# Patient Record
Sex: FEMALE | Race: BLACK OR AFRICAN AMERICAN | Employment: UNEMPLOYED | URBAN - METROPOLITAN AREA
[De-identification: names, ages, dates, MRNs, and addresses within clinical notes are randomized per-mention and may not be internally consistent; named-entity substitution may affect disease eponyms.]

---

## 2017-08-30 ENCOUNTER — OFFICE VISIT (OUTPATIENT)
Dept: INFECTIOUS DISEASES | Age: 82
End: 2017-08-30

## 2017-08-30 VITALS — DIASTOLIC BLOOD PRESSURE: 72 MMHG | TEMPERATURE: 98.3 F | SYSTOLIC BLOOD PRESSURE: 148 MMHG | WEIGHT: 174 LBS

## 2017-08-30 DIAGNOSIS — A49.02 MRSA INFECTION: ICD-10-CM

## 2017-08-30 DIAGNOSIS — L08.9 CHRONIC ABDOMINAL WOUND INFECTION, INITIAL ENCOUNTER: Primary | ICD-10-CM

## 2017-08-30 DIAGNOSIS — S31.109A CHRONIC ABDOMINAL WOUND INFECTION, INITIAL ENCOUNTER: Primary | ICD-10-CM

## 2017-08-30 PROCEDURE — G8427 DOCREV CUR MEDS BY ELIG CLIN: HCPCS | Performed by: INTERNAL MEDICINE

## 2017-08-30 PROCEDURE — G8421 BMI NOT CALCULATED: HCPCS | Performed by: INTERNAL MEDICINE

## 2017-08-30 PROCEDURE — 4040F PNEUMOC VAC/ADMIN/RCVD: CPT | Performed by: INTERNAL MEDICINE

## 2017-08-30 PROCEDURE — 1090F PRES/ABSN URINE INCON ASSESS: CPT | Performed by: INTERNAL MEDICINE

## 2017-08-30 PROCEDURE — 1036F TOBACCO NON-USER: CPT | Performed by: INTERNAL MEDICINE

## 2017-08-30 PROCEDURE — 99205 OFFICE O/P NEW HI 60 MIN: CPT | Performed by: INTERNAL MEDICINE

## 2017-08-30 PROCEDURE — 1123F ACP DISCUSS/DSCN MKR DOCD: CPT | Performed by: INTERNAL MEDICINE

## 2017-08-30 RX ORDER — ERGOCALCIFEROL 1.25 MG/1
50000 CAPSULE ORAL WEEKLY
COMMUNITY
End: 2020-02-26

## 2017-08-30 RX ORDER — CALCITRIOL 0.25 UG/1
0.25 CAPSULE, LIQUID FILLED ORAL DAILY
COMMUNITY
End: 2020-02-26

## 2017-08-30 RX ORDER — ISOSORBIDE MONONITRATE 60 MG/1
1 TABLET, EXTENDED RELEASE ORAL DAILY
Refills: 2 | COMMUNITY
Start: 2017-08-07 | End: 2020-02-26 | Stop reason: SDUPTHER

## 2017-08-30 RX ORDER — FERROUS SULFATE 325(65) MG
325 TABLET ORAL
COMMUNITY
End: 2020-02-26

## 2017-08-30 RX ORDER — ATORVASTATIN CALCIUM 40 MG/1
40 TABLET, FILM COATED ORAL DAILY
COMMUNITY
End: 2020-02-26

## 2017-08-30 RX ORDER — AMLODIPINE BESYLATE 5 MG/1
5 TABLET ORAL DAILY
COMMUNITY
End: 2020-02-26 | Stop reason: SDUPTHER

## 2017-09-02 LAB
GRAM STAIN RESULT: ABNORMAL
ORGANISM: ABNORMAL
WOUND/ABSCESS: ABNORMAL

## 2017-09-05 ENCOUNTER — HOSPITAL ENCOUNTER (OUTPATIENT)
Dept: ULTRASOUND IMAGING | Age: 82
Discharge: HOME | End: 2017-09-05
Attending: INTERNAL MEDICINE | Admitting: INTERNAL MEDICINE

## 2017-09-05 DIAGNOSIS — S31.109A OPEN WOUND OF ABDOMINAL WALL WITHOUT PENETRATION INTO PERITONEAL CAVITY: ICD-10-CM

## 2017-09-05 DIAGNOSIS — S31.109A CHRONIC ABDOMINAL WOUND INFECTION, INITIAL ENCOUNTER: ICD-10-CM

## 2017-09-05 DIAGNOSIS — L08.9 CHRONIC ABDOMINAL WOUND INFECTION, INITIAL ENCOUNTER: ICD-10-CM

## 2017-09-05 DIAGNOSIS — A49.02 MRSA INFECTION: ICD-10-CM

## 2017-09-08 ENCOUNTER — TELEPHONE (OUTPATIENT)
Dept: INFECTIOUS DISEASES | Age: 82
End: 2017-09-08

## 2017-09-08 DIAGNOSIS — R18.8 ABDOMINAL WALL FLUID COLLECTIONS: ICD-10-CM

## 2017-09-08 DIAGNOSIS — Z22.322 MRSA (METHICILLIN RESISTANT STAPH AUREUS) CULTURE POSITIVE: Primary | ICD-10-CM

## 2017-09-08 RX ORDER — DOXYCYCLINE HYCLATE 100 MG
100 TABLET ORAL 2 TIMES DAILY
Qty: 20 TABLET | Refills: 0 | Status: SHIPPED | OUTPATIENT
Start: 2017-09-08 | End: 2017-09-18

## 2017-09-20 ENCOUNTER — OFFICE VISIT (OUTPATIENT)
Dept: INFECTIOUS DISEASES | Age: 82
End: 2017-09-20

## 2017-09-20 ENCOUNTER — TELEPHONE (OUTPATIENT)
Dept: INFECTIOUS DISEASES | Age: 82
End: 2017-09-20

## 2017-09-20 VITALS
HEIGHT: 63 IN | DIASTOLIC BLOOD PRESSURE: 76 MMHG | SYSTOLIC BLOOD PRESSURE: 138 MMHG | TEMPERATURE: 98.6 F | WEIGHT: 174 LBS | BODY MASS INDEX: 30.83 KG/M2

## 2017-09-20 DIAGNOSIS — Z22.322 MRSA (METHICILLIN RESISTANT STAPH AUREUS) CULTURE POSITIVE: ICD-10-CM

## 2017-09-20 PROCEDURE — 1123F ACP DISCUSS/DSCN MKR DOCD: CPT | Performed by: INTERNAL MEDICINE

## 2017-09-20 PROCEDURE — 4040F PNEUMOC VAC/ADMIN/RCVD: CPT | Performed by: INTERNAL MEDICINE

## 2017-09-20 PROCEDURE — G8417 CALC BMI ABV UP PARAM F/U: HCPCS | Performed by: INTERNAL MEDICINE

## 2017-09-20 PROCEDURE — G8427 DOCREV CUR MEDS BY ELIG CLIN: HCPCS | Performed by: INTERNAL MEDICINE

## 2017-09-20 PROCEDURE — 1090F PRES/ABSN URINE INCON ASSESS: CPT | Performed by: INTERNAL MEDICINE

## 2017-09-20 PROCEDURE — 1036F TOBACCO NON-USER: CPT | Performed by: INTERNAL MEDICINE

## 2017-09-20 PROCEDURE — 99214 OFFICE O/P EST MOD 30 MIN: CPT | Performed by: INTERNAL MEDICINE

## 2017-09-26 ENCOUNTER — HOSPITAL ENCOUNTER (OUTPATIENT)
Dept: CT IMAGING | Age: 82
Discharge: HOME | End: 2017-09-26
Attending: INTERNAL MEDICINE | Admitting: INTERNAL MEDICINE

## 2017-09-26 DIAGNOSIS — Z22.322 MRSA (METHICILLIN RESISTANT STAPH AUREUS) CULTURE POSITIVE: ICD-10-CM

## 2017-09-26 DIAGNOSIS — K65.1 PERITONEAL ABSCESS (HCC): ICD-10-CM

## 2017-10-11 ENCOUNTER — OFFICE VISIT (OUTPATIENT)
Dept: INFECTIOUS DISEASES | Age: 82
End: 2017-10-11

## 2017-10-11 VITALS
HEIGHT: 63 IN | DIASTOLIC BLOOD PRESSURE: 66 MMHG | TEMPERATURE: 98.6 F | WEIGHT: 174 LBS | BODY MASS INDEX: 30.83 KG/M2 | SYSTOLIC BLOOD PRESSURE: 124 MMHG

## 2017-10-11 DIAGNOSIS — R18.8 ABDOMINAL WALL FLUID COLLECTIONS: Primary | ICD-10-CM

## 2017-10-11 DIAGNOSIS — A49.02 MRSA INFECTION: ICD-10-CM

## 2017-10-11 DIAGNOSIS — Z22.322 MRSA (METHICILLIN RESISTANT STAPH AUREUS) CULTURE POSITIVE: ICD-10-CM

## 2017-10-11 PROCEDURE — 1090F PRES/ABSN URINE INCON ASSESS: CPT | Performed by: INTERNAL MEDICINE

## 2017-10-11 PROCEDURE — 4040F PNEUMOC VAC/ADMIN/RCVD: CPT | Performed by: INTERNAL MEDICINE

## 2017-10-11 PROCEDURE — 1036F TOBACCO NON-USER: CPT | Performed by: INTERNAL MEDICINE

## 2017-10-11 PROCEDURE — G8417 CALC BMI ABV UP PARAM F/U: HCPCS | Performed by: INTERNAL MEDICINE

## 2017-10-11 PROCEDURE — 1123F ACP DISCUSS/DSCN MKR DOCD: CPT | Performed by: INTERNAL MEDICINE

## 2017-10-11 PROCEDURE — G8427 DOCREV CUR MEDS BY ELIG CLIN: HCPCS | Performed by: INTERNAL MEDICINE

## 2017-10-11 PROCEDURE — 99215 OFFICE O/P EST HI 40 MIN: CPT | Performed by: INTERNAL MEDICINE

## 2017-10-11 PROCEDURE — G8484 FLU IMMUNIZE NO ADMIN: HCPCS | Performed by: INTERNAL MEDICINE

## 2017-10-11 RX ORDER — DOXYCYCLINE HYCLATE 100 MG
100 TABLET ORAL 2 TIMES DAILY
Qty: 60 TABLET | Refills: 5 | Status: SHIPPED | OUTPATIENT
Start: 2017-10-11 | End: 2018-03-29 | Stop reason: SDUPTHER

## 2017-10-11 NOTE — PROGRESS NOTES
Infectious Diseases Consult Note    Reason for Consult:   Abdominal wound with culture + MRSA  Requesting Physician:   Jeannette Jaime NP  Primary Care Physician:  Osmar Niño MD  History Obtained From:   Patient, EPIC    CHIEF COMPLAINT:      Chief Complaint   Patient presents with    Follow-up     abdominal wound       HISTORY OF PRESENT ILLNESS:      79 yo woman with HTN, DM, vit D def, CKD, anemia, hyperlipidemia, fatigue. Initial consult 8/30 -   Pt is poor historian,  is helpful  --  Pt had abd wound for 6 - 8 mo. No surgery, no trauma. It is getting worse per pt. No pain. \"drains a little bit\". Culture taken 6/8/17 with skin greta and heavy MRSA (S T/S, doxycycline; R clinda; vanco TRACY 1). Has been on bactrim, keflex, doxycycline, amoxacillin per outpatient notes. pt reports that wound is same. No pain unless someone 'push on it'. Ultrasound 9/5 - fluid collection, 'tract', location 'indeterminate', 'may lie intra-abdominally'    On 9/8, pt HHA reported more drainage and treated with doxycycline 100 bid x 10 days    Today 9/10, pt reports less drainage. No pain. Feeling well - no complaints. Past Medical History:    Past Medical History:   Diagnosis Date    Anemia     CKD (chronic kidney disease)     Diabetes ([facility]     Hyperlipidemia     Hypertension     Vitamin D deficiency        Past Surgical History:    No past surgical history on file.     Current Medications:    Current Outpatient Prescriptions   Medication Sig Dispense Refill    isosorbide mononitrate (IMDUR) 60 MG extended release tablet Take 1 tablet by mouth daily  2    calcitRIOL (ROCALTROL) 0.25 MCG capsule Take 0.25 mcg by mouth daily      amLODIPine (NORVASC) 5 MG tablet Take 5 mg by mouth daily      atorvastatin (LIPITOR) 40 MG tablet Take 40 mg by mouth daily      vitamin D (ERGOCALCIFEROL) 14543 units CAPS capsule Take 50,000 Units by mouth once a week      ferrous sulfate 325 (65 Fe) MG tablet Take 325 mg by mouth 3 times daily (with meals)      amiodarone (CORDARONE) 200 MG tablet TAKE 1 TABLET BY MOUTH DAILY 30 tablet 0     No current facility-administered medications for this visit. Allergies:  No known allergies    Social History:    TOBACCO:    None   ETOH:     None   DRUGS:     None   MARITAL STATUS:           Patient lives in community    Family History:   No immunodeficiency     REVIEW OF SYSTEMS:    No fever / chills / sweats. No weight loss. No visual change, eye pain, eye discharge. No oral lesion, sore throat, dysphagia. Denies cough / sputum. Denies chest pain, palpitations. Denies n / v / abd pain. No diarrhea. Denies dysuria or change in urinary function. Denies joint swelling or pain. No myalgia, arthralgia. Denies skin changes, itching  Denies new / worse depression, psychiatric symptoms  Denies focal weakness, sensory change or other neurologic symptom  No symptoms endocrinopathy. No symptoms hematologic disease. PHYSICAL EXAM:    Vitals:  See intake vitals including weight    GENERAL: No apparent distress. HEENT: Membranes moist, no oral lesion  NECK:  Supple  LYMPH: No adenopathy   LUNGS: Clear b/l, no rales, no dullness  CARDIAC: RRR, no murmur appreciated  ABD:  + BS, soft / NT - R upper abd, small 'pinhole', no drainage expressed; no skin / cutaneous erythema or changes  EXT:  No rash, no edema, no lesions  NEURO: No focal neurologic findings  PSYCH: Orientation, sensorium, mood normal    DATA:    See EPIC    9/26 Abd CT w / wo contrast:  Small caliber catheter fragment within the fat of the lower   anterior rightward mediastinum with associated strandy fluid   collection extending inferiorly and anteriorly to the skin surface   of the upper rightward anterior abdominal wall. 9/5/17 Abd ultrasound:  Fluid collection as described may relate to intra-abdominal   collection extending to the skin surface.  CT examination is   recommended however to determine the origin of the collection and   the extent of the collection     8/30/17 Drainge: culture - light [facility] MRSA   Antibiotic Interpretation TRACY Unit   ceFAZolin Resistant >16 mcg/mL   clindamycin Resistant >2 mcg/mL   erythromycin Resistant >4 mcg/mL   oxacillin Resistant >2 mcg/mL   tetracycline Sensitive <=0.5 mcg/mL   trimethoprim-sulfamethoxazole Sensitive <=0.5/9.5 mcg/mL   vancomycin Sensitive 1 mcg/mL         IMPRESSION:     Abdominal wall drainage from small, 'pinhole', able to probe 3 cm, no assoc abd wall / skin cellulitis. Has collection on ultrasound. CT scan verified an intrathoracic source - has 'catheter'/ radiodense FQ with inflammation and tract to skin in upper abdomen. She was hosp at [facility] 8/2008, had MI, had cath with occluded RCA and had stent placed. She had UTI and MRSA bacteremia per records (!). She now has FB on cardiac silhouette with inflammation and associated tract draining, culture + MRSA (!). I believe what she has is related to procedure in 2008. Would 'suppress' infection. RECOMMENDATIONS:      1. Start doxycycline 100 bid - reviewed SE, how to take. 2. DSD over wound daily - discussed that pt has a chronic problem and will have draining wound  3. Call if worse, if develops systemic symptoms  4. Return in 4 weeks -     - Spent over 40 minutes on visit (including history, physical exam, review of data, development and implementation of treatment plan and coordination of care. - Over 50% of time spent in pt counseling and education.     Called and spoke to Josiah Martin NP - [address] [phone]  Alayna Conklin MD

## 2018-03-29 RX ORDER — DOXYCYCLINE HYCLATE 100 MG
100 TABLET ORAL 2 TIMES DAILY
Qty: 60 TABLET | Refills: 5 | Status: SHIPPED | OUTPATIENT
Start: 2018-03-29 | End: 2018-04-28

## 2019-10-30 RX ORDER — DOXYCYCLINE HYCLATE 100 MG
TABLET ORAL
Qty: 60 TABLET | Refills: 0 | Status: SHIPPED | OUTPATIENT
Start: 2019-10-30 | End: 2019-12-16 | Stop reason: SDUPTHER

## 2020-01-23 ENCOUNTER — TELEPHONE (OUTPATIENT)
Dept: PRIMARY CARE CLINIC | Age: 85
End: 2020-01-23

## 2020-02-26 ENCOUNTER — OFFICE VISIT (OUTPATIENT)
Dept: PRIMARY CARE CLINIC | Age: 85
End: 2020-02-26
Payer: COMMERCIAL

## 2020-02-26 VITALS
BODY MASS INDEX: 39.04 KG/M2 | TEMPERATURE: 97.1 F | SYSTOLIC BLOOD PRESSURE: 138 MMHG | OXYGEN SATURATION: 99 % | WEIGHT: 186 LBS | RESPIRATION RATE: 16 BRPM | HEART RATE: 71 BPM | HEIGHT: 58 IN | DIASTOLIC BLOOD PRESSURE: 70 MMHG

## 2020-02-26 PROCEDURE — 99203 OFFICE O/P NEW LOW 30 MIN: CPT | Performed by: INTERNAL MEDICINE

## 2020-02-26 RX ORDER — ACETAMINOPHEN 160 MG
1 TABLET,DISINTEGRATING ORAL DAILY
COMMUNITY
Start: 2020-02-06 | End: 2020-04-09

## 2020-02-26 RX ORDER — ISOSORBIDE MONONITRATE 60 MG/1
60 TABLET, EXTENDED RELEASE ORAL DAILY
Qty: 30 TABLET | Refills: 2 | Status: SHIPPED | OUTPATIENT
Start: 2020-02-26 | End: 2021-01-06 | Stop reason: SDUPTHER

## 2020-02-26 RX ORDER — OMEPRAZOLE 20 MG/1
20 TABLET, DELAYED RELEASE ORAL DAILY
Qty: 90 TABLET | Refills: 1 | Status: SHIPPED | OUTPATIENT
Start: 2020-02-26 | End: 2020-11-25

## 2020-02-26 RX ORDER — GREEN TEA/HOODIA GORDONII 315-12.5MG
CAPSULE ORAL DAILY
COMMUNITY
End: 2020-02-26

## 2020-02-26 RX ORDER — AMIODARONE HYDROCHLORIDE 200 MG/1
100 TABLET ORAL DAILY
Qty: 45 TABLET | Refills: 3 | Status: SHIPPED | OUTPATIENT
Start: 2020-02-26 | End: 2020-02-26

## 2020-02-26 RX ORDER — GREEN TEA/HOODIA GORDONII 315-12.5MG
1 CAPSULE ORAL DAILY
Qty: 30 TABLET | Refills: 5 | COMMUNITY
Start: 2020-02-26

## 2020-02-26 RX ORDER — CLOPIDOGREL BISULFATE 75 MG/1
75 TABLET ORAL DAILY
Qty: 90 TABLET | Refills: 1 | Status: SHIPPED | OUTPATIENT
Start: 2020-02-26 | End: 2020-08-27

## 2020-02-26 RX ORDER — OMEPRAZOLE 20 MG/1
20 TABLET, DELAYED RELEASE ORAL DAILY
COMMUNITY
End: 2020-02-26 | Stop reason: SDUPTHER

## 2020-02-26 RX ORDER — AMLODIPINE BESYLATE 5 MG/1
5 TABLET ORAL DAILY
Qty: 90 TABLET | Refills: 3 | Status: SHIPPED | OUTPATIENT
Start: 2020-02-26 | End: 2020-10-22

## 2020-02-26 RX ORDER — AMIODARONE HYDROCHLORIDE 200 MG/1
100 TABLET ORAL DAILY
Qty: 90 TABLET | Refills: 3 | Status: SHIPPED | OUTPATIENT
Start: 2020-02-26 | End: 2020-11-25

## 2020-02-26 RX ORDER — CLOPIDOGREL BISULFATE 75 MG/1
TABLET ORAL DAILY
COMMUNITY
Start: 2020-02-03 | End: 2020-02-26 | Stop reason: SDUPTHER

## 2020-02-26 RX ORDER — AMIODARONE HYDROCHLORIDE 200 MG/1
TABLET ORAL
Qty: 90 TABLET | Refills: 3 | Status: SHIPPED | OUTPATIENT
Start: 2020-02-26 | End: 2020-02-26 | Stop reason: SDUPTHER

## 2020-02-26 ASSESSMENT — PATIENT HEALTH QUESTIONNAIRE - PHQ9
SUM OF ALL RESPONSES TO PHQ9 QUESTIONS 1 & 2: 0
DEPRESSION UNABLE TO ASSESS: FUNCTIONAL CAPACITY MOTIVATION LIMITS ACCURACY
SUM OF ALL RESPONSES TO PHQ QUESTIONS 1-9: 0
2. FEELING DOWN, DEPRESSED OR HOPELESS: 0
SUM OF ALL RESPONSES TO PHQ QUESTIONS 1-9: 0
1. LITTLE INTEREST OR PLEASURE IN DOING THINGS: 0

## 2020-02-26 NOTE — PROGRESS NOTES
2020    Gopal Burnett (:  1921) is a 80 y.o. female, here for a preventive medicine evaluation and to establish care. Patient Active Problem List   Diagnosis    Primary osteoarthritis of left knee    Coronary artery disease involving native coronary artery of native heart without angina pectoris    Essential hypertension    CKD (chronic kidney disease) stage 4, GFR 15-29 ml/min ([facility])    Secondary renal hyperparathyroidism ([address]    Type 2 diabetes mellitus with complication, without long-term current use of insulin (HCC)    History of myocardial infarction    Mixed hyperlipidemia    Foreign body of abdominal wall    Anemia    CKD (chronic kidney disease)    Diabetes ([address]    Hyperlipidemia    Hypertension    Vitamin D deficiency    Shortened MD interval       Care Everywhere Result Report  COMPREHENSIVE METABOLIC PANELResulted:  8:43 PM  [facility]  Component Name Value Ref Range   Sodium 141 135 - 146 mmol/L   Potassium 3.2 (L) 3.5 - 5.1 mmol/L   Chloride 104 98 - 110 mmol/L   CO2 24 22 - 29 mmol/L   Anion Gap 13 5 - 13 mmol/L   BUN 17 7 - 25 mg/dL   Creatinine 1.97 (H) 0.5 - 1.2 mg/dL   Glucose 104 (H) 70 - 99 mg/dL   GFR MDRD Af Amer 28   Comment:  GFR is estimated using Creatinine, age, gender and race. Patient's values should be interpreted as a trend.      Below 30 ml/min/1.73m2, renal disease is likely and GFR severely reduced.     For additional information:     [email or web address] org See Note   Calcium 9.3 8.4 - 10.5 mg/dL   GFR MDRD Non Af Amer 23   Comment:  GFR is estimated using Creatinine, age, gender and race. Patient's values should be interpreted as a trend.      Below 30 ml/min/1.73m2, renal disease is likely and GFR severely reduced.     For additional information:     [email or web address] org See Note   Total Bilirubin 0.9 0.2 - 1.2 mg/dL   AST 32 (H) 0 - 30 U/L   ALT 23 0 - 40 U/L   Alkaline Phosphatase 98 33 - 140 U/L   Total Protein 6.0 6 - 8 g/dL   Albumin 3.1 (L) 3.5 - 5 g/dL   Globulin 2.9 2 - 3.7 g/dL   Albumin/Globulin Ratio 1.1      1/21/2019 6/21/2018 12/20/2017 10/26/2017 7/13/2017 4/26/2017 10/13/2016 2/1/2016 5/27/2011 5/25/2011 5/23/2011 5/20/2011 5/18/2011 5/18/2011 5/17/2011 5/16/2011 5/15/2011 5/14/2011 3/25/2011 10/24/2008 10/23/2008 10/22/2008 10/22/2008 10/21/2008 10/20/2008 10/19/2008 10/18/2008 10/17/2008 10/16/2008 10/15/2008 10/14/2008 10/14/2008 10/14/2008 10/13/2008 10/13/2008 10/13/2008     8.8 7.4 7.7 9.0 8.4 9.3 7.9 8.5 6.6 7.0 6.6 6.8 7.4 6.8 7.1 7.3 6.1 8.4   11.6 (H) 12.3 (H)   10.8 16.4 (H) 13.5 (H) 14.2 (H) 15.1 (H) 18.5 (H) 21.1 (H) 26.3 (H) 24.5 (H) 28.6 (H) 27.1 (H) 20.1 (H) 21.0 (H) 20.3 (H)   4.23 4.14 4.18 4.37 4.20 4.23 4.29 4.34 3.29 (L) 3.47 (L) 3.28 (L) 3.23 (L) 3.44 (L) 3.29 (L) 3.26 (L) 3.29 (L) 3.19 (L) 4.04   3.08 (L) 3.23 (L)   2.49 (L) 3.22 (L) 3.04 (L) 3.08 (L) 3.04 (L) 3.24 (L) 3.56 (L) 4.05 3.91 4.44 4.38 3.94 4.84 5.05   12.6 12.6 12.5 13.1 12.4 12.4 12.5 13.0 9.6 (L) 10.0 (L) 9.4 (L) 9.4 (L) 9.9 (L) 9.6 (L) 9.5 (L) 9.6 (L) 9.3 (L) 11.7 12.0 9.3 (L) 9.7 (L) 7.8 (L) 7.5 (L) 9.7 (L) 9.2 (L) 9.3 (L) 9.2 (L) 9.8 (L) 10.4 (L) 11.9 11.5 (L) 13.0 12.8 11.8 14.4 15.2   37.3 37.1 36.6 38.6 36.7 36.8 37.7 39.4 29.2 (L) 30.5 (L) 28.8 (L) 28.4 (L) 30.4 (L) 29.1 (L) 28.6 (L) 29.3 (L) 27.9 (L) 35.4 36.5 27 (L) 28.2 (L) 23 (L) 21.7 (L) 28.1 (L) 26.3 (L) 26.7 (L) 26.5 (L) 28.2 (L) 30.8 (L) 35.3 33.8 (L) 38.3 37.8 35 42.5 44   88.3 89.6 87.6 88.3 87.3 87.1 88.0 90.8 88.6 87.9 87.9 87.9 88.4 88.6 87.8 89.0 87.5 87.6   87.8 87.2 88.1 87.2 87.4 86.6 86.6 87.2 87.1 86.6 87.1 86.6 86.4 86.3 88.8 87.8 87.1   29.8 30.4 30.0 30.0 29.5 29.2 29.2 29.9 29.2 28.9 28.6 29.0 28.9 29.2 29.0 29.1 29.0 29.0   30.3 30.1 29.9 30.2 30.2 30.2 30.1 30.4 30.3 29.3 29.3 29.3 29.4 29.2 30.1 29.7 30.1   33.7 33.9 34.2 34.0 33.8 33.6 33.2 32.9 33.0 32.9 32.6 33.0 32.7 33.0 33.0 32.7 33.2 33.1   34.5 34.5 34.0 34.6 34.6 34.9 34.8 34.8 34.8 33.9 33.7 33.9 34.0 sounds: Normal breath sounds. No stridor. No wheezing or rhonchi. Chest:      Chest wall: No tenderness. Abdominal:      General: Bowel sounds are normal. There is no distension. Palpations: There is no mass. Tenderness: There is no abdominal tenderness. There is no right CVA tenderness, left CVA tenderness, guarding or rebound. Hernia: No hernia is present. Musculoskeletal:         General: No swelling, tenderness, deformity or signs of injury. Right lower leg: No edema. Left lower leg: No edema. Comments: Deformity of left knee with swelling but no warmth and limited range of motion. Lymphadenopathy:      Cervical: No cervical adenopathy. Skin:     General: Skin is warm and dry. Neurological:      General: No focal deficit present. Mental Status: She is alert and oriented to person, place, and time. Cranial Nerves: No cranial nerve deficit. Sensory: No sensory deficit. Motor: No weakness. Coordination: Coordination normal.      Gait: Gait normal.   Psychiatric:         Mood and Affect: Mood normal.         Behavior: Behavior normal.         Thought Content: Thought content normal.         Judgment: Judgment normal.         No flowsheet data found. No results found for: CHOL, CHOLFAST, TRIG, TRIGLYCFAST, HDL, LDLCHOLESTEROL, LDLCALC, GLUF, GLUCOSE, LABA1C    The ASCVD Risk score (Severo Elvira., et al., 2013) failed to calculate for the following reasons: The 2013 ASCVD risk score is only valid for ages 36 to 78      There is no immunization history on file for this patient.     Health Maintenance   Topic Date Due    TSH testing  01/05/1921    Lipid screen  01/05/1931    DTaP/Tdap/Td vaccine (1 - Tdap) 01/05/1932    Shingles Vaccine (1 of 2) 01/05/1971    Pneumococcal 65+ years Vaccine (1 of 1 - PPSV23) 01/05/1986    Flu vaccine (1) 09/01/2019    Hepatitis A vaccine  Aged Out    Hepatitis B vaccine  Aged Out    Hib vaccine  Aged C/ [address] Meningococcal (ACWY) vaccine  Aged Out        Diagnosis Orders   1. Decreased hearing of both Conception DO Maddy, Otolaryngology, [facility]   2. Paroxysmal atrial fibrillation (HCC)  AMIODARONE LEVEL    DISCONTINUED: amiodarone (CORDARONE) 200 MG tablet   3. Coronary artery disease involving native coronary artery of native heart without angina pectoris  clopidogrel (PLAVIX) 75 MG tablet    isosorbide mononitrate (IMDUR) 60 MG extended release tablet   4. Gastroesophageal reflux disease without esophagitis  omeprazole (PRILOSEC OTC) 20 MG tablet   5. Essential hypertension  amLODIPine (NORVASC) 5 MG tablet    TSH WITH REFLEX TO FT4    Renal Function Panel   6. Type 2 diabetes mellitus with complication, without long-term current use of insulin ([facility]) will monitor A1c. Not symptomatic  Hemoglobin A1C    Vitamin B12   7. Vitamin D deficiency on supplement with goal 40-15 monitor level. Vitamin D 25 Hydroxy   8. Primary osteoarthritis of left knee surgical candidate and currently not having problems. Will monitor since she will be at candidate for Cartledge injection or external braces. Ambulating will now independently. 9. CKD (chronic kidney disease) stage 4, GFR 15-29 ml/min ([facility]) stable under care of nephrology avoid NSAIDs and nephrotoxic medications. 10. Secondary renal hyperparathyroidism ([facility] [address] managed by nephrology. 11.  foreign body catheter tip and anterior mediastinum wall tracking to the lower abdomen abdominal wall first noted in 2017 very ill at that time and hospitalized. Subsequently she is been on doxycycline hundred milligrams twice a day and told children to take this for the rest of her life. Patient has not had any adverse effects with doxycycline. Of doxycycline. 12. Anemia due to chronic kidne with y disease, unspecified CKD stage     15. Stage 4 chronic kidney disease ([address]     16.  Diabetes mellitus due to underlying condition with stage 4 chronic kidney disease, unspecified whether long term insulin use ([facility] [address]     17. Shortened [place] interval     18. Intramural leiomyoma of uterus     19. Decreased hearing, bilateral refer to ENT to see candidate for hearing aids. An electronic signature was used to authenticate this note.     --Tania Lynch MD on 2/26/2020 at 5:40 PM

## 2020-02-28 PROBLEM — D25.1 INTRAMURAL LEIOMYOMA OF UTERUS: Status: ACTIVE | Noted: 2020-02-28

## 2020-02-28 PROBLEM — S30.851A FOREIGN BODY OF ABDOMINAL WALL: Status: ACTIVE | Noted: 2020-02-28

## 2020-02-28 PROBLEM — N25.81 SECONDARY RENAL HYPERPARATHYROIDISM (HCC): Status: ACTIVE | Noted: 2020-02-28

## 2020-02-28 PROBLEM — E11.8 TYPE 2 DIABETES MELLITUS WITH COMPLICATION, WITHOUT LONG-TERM CURRENT USE OF INSULIN (HCC): Status: ACTIVE | Noted: 2020-02-28

## 2020-02-28 PROBLEM — I25.2 HISTORY OF MYOCARDIAL INFARCTION: Status: ACTIVE | Noted: 2020-02-28

## 2020-02-28 PROBLEM — E78.2 MIXED HYPERLIPIDEMIA: Status: ACTIVE | Noted: 2020-02-28

## 2020-02-28 PROBLEM — N18.4 CKD (CHRONIC KIDNEY DISEASE) STAGE 4, GFR 15-29 ML/MIN (HCC): Status: ACTIVE | Noted: 2020-02-28

## 2020-02-28 PROBLEM — M17.12 PRIMARY OSTEOARTHRITIS OF LEFT KNEE: Status: ACTIVE | Noted: 2020-02-28

## 2020-02-28 PROBLEM — H91.93 DECREASED HEARING, BILATERAL: Status: ACTIVE | Noted: 2020-02-28

## 2020-02-28 PROBLEM — I25.10 CORONARY ARTERY DISEASE INVOLVING NATIVE CORONARY ARTERY OF NATIVE HEART WITHOUT ANGINA PECTORIS: Status: ACTIVE | Noted: 2020-02-28

## 2020-02-28 PROBLEM — I10 ESSENTIAL HYPERTENSION: Status: ACTIVE | Noted: 2020-02-28

## 2020-02-28 PROBLEM — R94.31 SHORTENED PR INTERVAL: Status: ACTIVE | Noted: 2020-02-28

## 2020-02-28 SDOH — HEALTH STABILITY: PHYSICAL HEALTH: ON AVERAGE, HOW MANY DAYS PER WEEK DO YOU ENGAGE IN MODERATE TO STRENUOUS EXERCISE (LIKE A BRISK WALK)?: 0 DAYS

## 2020-02-28 SDOH — HEALTH STABILITY: MENTAL HEALTH
STRESS IS WHEN SOMEONE FEELS TENSE, NERVOUS, ANXIOUS, OR CAN'T SLEEP AT NIGHT BECAUSE THEIR MIND IS TROUBLED. HOW STRESSED ARE YOU?: NOT AT ALL

## 2020-02-28 ASSESSMENT — ENCOUNTER SYMPTOMS
RESPIRATORY NEGATIVE: 1
ALLERGIC/IMMUNOLOGIC NEGATIVE: 1

## 2020-03-05 NOTE — PROGRESS NOTES
Deann Chopra   1/5/1921, 80 y.o. female   [id number]       Referring Provider: Rhianna Claros DO  Referral Type: In an order in [address] [place] [address]    Reason for Visit: Evaluation of suspected change in hearing, tinnitus, or balance. ADULT AUDIOLOGIC EVALUATION      Deann Chopra is a 80 y.o. female seen today, 3/11/2020 for an initial audiologic evaluation. Patient was seen accompanied by her son. AUDIOLOGIC AND OTHER PERTINENT MEDICAL HISTORY:        Deann Chopra noted decreased hearing bilaterally, she believes she hears okay but her family has concerns, no concerns for a difference in hearing between her ears, both are about the same. Deann Chopra denied otalgia, aural fullness, otorrhea, tinnitus, dizziness, history of occupational/recreational noise exposure, history of head trauma, history of ear surgery, and family history of hearing loss. IMPRESSIONS:       Today's results are consistent with bilateral sensorineural hearing loss. Hearing loss is significant enough to result in difficulty understanding speech in most listening environments. Recommended binaural amplification. ASSESSMENT AND FINDINGS:       Otoscopy revealed: Minimal cerumen observed in ear canals bilaterally      RIGHT EAR:  Hearing Sensitivity: Moderate through 3000 Hz sloping to severe sensorineural hearing loss. Speech Recognition Threshold: 45 dB HL (MLV)  Word Recognition: Good (80%), based on NU-6 25-word list at 75 dBHL using recorded speech stimuli. This finding is consistent with hearing sensitivity. Tympanometry: Borderline-negative peak pressure with low compliance, Type As tympanogram, consistent with reduced tympanic membrane mobility. LEFT EAR:  Hearing Sensitivity: Moderate through 2000 Hz sloping to severe sensorineural hearing loss. Speech Recognition Threshold: 50 dB HL (MLV)  Word Recognition: Good (80%), based on NU-6 25-word list at 75 dBHL using recorded speech stimuli.   This finding is consistent with

## 2020-03-11 ENCOUNTER — PROCEDURE VISIT (OUTPATIENT)
Dept: AUDIOLOGY | Age: 85
End: 2020-03-11
Payer: COMMERCIAL

## 2020-03-11 ENCOUNTER — OFFICE VISIT (OUTPATIENT)
Dept: ENT CLINIC | Age: 85
End: 2020-03-11
Payer: COMMERCIAL

## 2020-03-11 VITALS
BODY MASS INDEX: 31.91 KG/M2 | DIASTOLIC BLOOD PRESSURE: 85 MMHG | HEART RATE: 71 BPM | TEMPERATURE: 97.1 F | HEIGHT: 58 IN | SYSTOLIC BLOOD PRESSURE: 169 MMHG | WEIGHT: 152 LBS

## 2020-03-11 PROBLEM — H90.3 SENSORINEURAL HEARING LOSS, BILATERAL: Status: ACTIVE | Noted: 2020-03-11

## 2020-03-11 PROCEDURE — 92567 TYMPANOMETRY: CPT | Performed by: AUDIOLOGIST

## 2020-03-11 PROCEDURE — 92557 COMPREHENSIVE HEARING TEST: CPT | Performed by: AUDIOLOGIST

## 2020-03-11 PROCEDURE — 99203 OFFICE O/P NEW LOW 30 MIN: CPT | Performed by: OTOLARYNGOLOGY

## 2020-03-11 ASSESSMENT — ENCOUNTER SYMPTOMS
FACIAL SWELLING: 0
EYE ITCHING: 0
SINUS PRESSURE: 0
WHEEZING: 0
SHORTNESS OF BREATH: 0
COUGH: 0
BLOOD IN STOOL: 0
PHOTOPHOBIA: 0
SINUS PAIN: 0
CHOKING: 0
BACK PAIN: 0
EYE DISCHARGE: 0
VOMITING: 0
RHINORRHEA: 0
DIARRHEA: 0
NAUSEA: 0
CONSTIPATION: 0
COLOR CHANGE: 0
VOICE CHANGE: 0
SORE THROAT: 0
TROUBLE SWALLOWING: 0
STRIDOR: 0

## 2020-03-11 NOTE — PROGRESS NOTES
file   Tobacco Use    Smoking status: Never Smoker    Smokeless tobacco: Never Used   Substance and Sexual Activity    Alcohol use: Not Currently    Drug use: Never    Sexual activity: Not Currently     Partners: Male   Lifestyle    Physical activity     Days per week: 0 days     Minutes per session: Not on file    Stress: Not at all   Relationships    Social connections     Talks on phone: Not on file     Gets together: Not on file     Attends Religion service: Not on file     Active member of club or organization: Not on file     Attends meetings of clubs or organizations: Not on file     Relationship status: Not on file    Intimate partner violence     Fear of current or ex partner: Not on file     Emotionally abused: Not on file     Physically abused: Not on file     Forced sexual activity: Not on file   Other Topics Concern    Not on file   Social History Narrative    Not on file       Allergies     Allergies   Allergen Reactions    No Known Allergies        Medications     Current Outpatient Medications   Medication Sig Dispense Refill    Cholecalciferol (VITAMIN D3) 50 MCG (2000 UT) CAPS Take 1 capsule by mouth daily      clopidogrel (PLAVIX) 75 MG tablet Take 1 tablet by mouth daily 90 tablet 1    omeprazole (PRILOSEC OTC) 20 MG tablet Take 1 tablet by mouth daily 90 tablet 1    isosorbide mononitrate (IMDUR) 60 MG extended release tablet Take 1 tablet by mouth daily 30 tablet 2    amLODIPine (NORVASC) 5 MG tablet Take 1 tablet by mouth daily 90 tablet 3    Lactobacillus (PROBIOTIC ACIDOPHILUS) TABS Take 1 tablet by mouth daily 30 tablet 5    amiodarone (CORDARONE) 200 MG tablet Take 0.5 tablets by mouth daily disreguard the previous script only taking 100 mg daily. 90 tablet 3    doxycycline hyclate (VIBRA-TABS) 100 MG tablet TAKE 1 TABLET BY MOUTH TWICE DAILY 60 tablet 11     No current facility-administered medications for this visit.         Review of Systems     Review of Systems Constitutional: Negative for activity change, appetite change, chills, diaphoresis, fatigue, fever and unexpected weight change. HENT: Positive for hearing loss. Negative for congestion, dental problem, drooling, ear discharge, ear pain, facial swelling, mouth sores, nosebleeds, postnasal drip, rhinorrhea, sinus pressure, sinus pain, sneezing, sore throat, tinnitus, trouble swallowing and voice change. Eyes: Negative for photophobia, discharge, itching and visual disturbance. Respiratory: Negative for cough, choking, shortness of breath, wheezing and stridor. Gastrointestinal: Negative for blood in stool, constipation, diarrhea, nausea and vomiting. Endocrine: Negative for cold intolerance, heat intolerance, polyphagia and polyuria. Musculoskeletal: Negative for back pain, gait problem, neck pain and neck stiffness. Skin: Negative for color change, pallor, rash and wound. Neurological: Negative for dizziness, syncope, facial asymmetry, speech difficulty, light-headedness, numbness and headaches. Hematological: Negative for adenopathy. Does not bruise/bleed easily. Psychiatric/Behavioral: Negative for agitation, confusion and sleep disturbance. PhysicalExam     Vitals:    03/11/20 1329   BP: (!) 169/85   Pulse: 71   Temp: 97.1 °F (36.2 °C)       Physical Exam  Constitutional:       Appearance: She is well-developed. HENT:      Head: Normocephalic and atraumatic. Not macrocephalic and not microcephalic. No raccoon eyes, Mercado's sign, abrasion, contusion, right periorbital erythema, left periorbital erythema or laceration. Hair is normal.      Jaw: No trismus. Right Ear: Hearing, tympanic membrane and external ear normal. No decreased hearing noted. No drainage, swelling or tenderness. No middle ear effusion. No mastoid tenderness. Tympanic membrane is not perforated, retracted or bulging. Tympanic membrane has normal mobility.       Left Ear: Hearing, tympanic membrane and external ear normal. No decreased hearing noted. No drainage, swelling or tenderness. No middle ear effusion. No mastoid tenderness. Tympanic membrane is not perforated, retracted or bulging. Tympanic membrane has normal mobility. Nose: No nasal deformity, septal deviation, laceration, mucosal edema or rhinorrhea. Right Sinus: No maxillary sinus tenderness or frontal sinus tenderness. Left Sinus: No maxillary sinus tenderness or frontal sinus tenderness. Mouth/Throat:      Mouth: Mucous membranes are not pale, not dry and not cyanotic. No lacerations or oral lesions. Dentition: Normal dentition. No dental caries or dental abscesses. Pharynx: Uvula midline. No oropharyngeal exudate, posterior oropharyngeal erythema or uvula swelling. Tonsils: No tonsillar abscesses. Eyes:      General: Lids are normal.         Right eye: No discharge. Left eye: No discharge. Extraocular Movements:      Right eye: Normal extraocular motion and no nystagmus. Left eye: Normal extraocular motion and no nystagmus. Conjunctiva/sclera:      Right eye: No chemosis or exudate. Left eye: No chemosis or exudate. Neck:      Musculoskeletal: Neck supple. Thyroid: No thyroid mass or thyromegaly. Vascular: Normal carotid pulses. Trachea: No tracheal tenderness or tracheal deviation. Cardiovascular:      Rate and Rhythm: Normal rate and regular rhythm. Pulmonary:      Effort: No tachypnea, bradypnea or respiratory distress. Breath sounds: No stridor. Musculoskeletal:      Right shoulder: She exhibits normal range of motion. Lymphadenopathy:      Head:      Right side of head: No submental, submandibular, tonsillar, preauricular, posterior auricular or occipital adenopathy. Left side of head: No submental, submandibular, tonsillar, preauricular, posterior auricular or occipital adenopathy. Cervical: No cervical adenopathy.       Right cervical: No

## 2020-03-11 NOTE — Clinical Note
Dr. Rod Gitelman,  Please see note from this patient's audiogram from today. Please let me know if there is anything further you need.    Jonn Coy [id number] [facility] Audi[facility]

## 2020-03-11 NOTE — PATIENT INSTRUCTIONS
happen when you are exposed long-term to loud noise. Examples include listening to loud music, riding motorcycles, or being around other loud machines. Hearing loss can affect your work and home life. It can make you feel lonely or depressed. You may feel that you have lost your independence. But hearing aids and other devices can help you hear better and feel connected to others. Follow-up care is a key part of your treatment and safety. Be sure to make and go to all appointments, and call your doctor if you are having problems. It's also a good idea to know your test results and keep a list of the medicines you take. How can you care for yourself at home? · Avoid loud noises whenever possible. This helps keep your hearing from getting worse. Always wear hearing protection around loud noises. · If appropriate, wear hearing aid(s) as directed. It is recommended that hearing aids are worn during all waking hours to keep your brain active and give it access to the sounds it is missing. · If you are beginning your process with hearing aid(s), schedule a \"Hearing Aid Evaluation\" with an audiologist to discuss your lifestyle, features of hearing aid technology, and styles of hearing aids available. It is recommended that you contact your insurance company to determine if you have a hearing aid benefit, as this may dictate who you can see for these services. · Have hearing tests as your doctor suggests. They can show whether your hearing has changed. Your hearing aid may need to be adjusted. · Use other assistive devices as needed. These may include:  ? Telephone amplifiers and hearing aids that can connect to a television, stereo, radio, or microphone. ? Devices that use lights or vibrations. These alert you to the doorbell, a ringing telephone, or a baby monitor. ? Television closed-captioning. This shows the words at the bottom of the screen. Most new TVs can do this. ? TTY (text telephone). This lets you type messages back and forth on the telephone instead of talking or listening. These devices are also called TDD. When messages are typed on the keyboard, they are sent over the phone line to a receiving TTY. The message is shown on a monitor. · Use pagers, fax machines, text, and email if it is hard for you to communicate by telephone. · Try to learn a listening technique called speech-reading. It is not lip-reading. You pay attention to people's gestures, expressions, posture, and tone of voice. These clues can help you understand what a person is saying. Face the person you are talking to, and have him or her face you. Make sure the lighting is good. You need to see the other person's face clearly. · Think about counseling if you need help to adjust to your hearing loss. When should you call for help? Watch closely for changes in your health, and be sure to contact your doctor if:    · You think your hearing is getting worse. · You have new symptoms, such as dizziness or nausea.

## 2020-03-24 LAB
ALBUMIN SERPL-MCNC: 4.2 G/DL (ref 3.4–5)
ANION GAP SERPL CALCULATED.3IONS-SCNC: 13 MMOL/L (ref 3–16)
BUN BLDV-MCNC: 34 MG/DL (ref 7–20)
CALCIUM SERPL-MCNC: 9.9 MG/DL (ref 8.3–10.6)
CHLORIDE BLD-SCNC: 98 MMOL/L (ref 99–110)
CO2: 23 MMOL/L (ref 21–32)
CREAT SERPL-MCNC: 1.5 MG/DL (ref 0.6–1.2)
GFR AFRICAN AMERICAN: 39
GFR NON-AFRICAN AMERICAN: 32
GLUCOSE BLD-MCNC: 145 MG/DL (ref 70–99)
PHOSPHORUS: 2.9 MG/DL (ref 2.5–4.9)
POTASSIUM SERPL-SCNC: 4.2 MMOL/L (ref 3.5–5.1)
SODIUM BLD-SCNC: 134 MMOL/L (ref 136–145)
T4 FREE: 1.3 NG/DL (ref 0.9–1.8)
TSH REFLEX FT4: 4.85 UIU/ML (ref 0.27–4.2)
VITAMIN B-12: 848 PG/ML (ref 211–911)
VITAMIN D 25-HYDROXY: 48.1 NG/ML

## 2020-03-25 LAB
ESTIMATED AVERAGE GLUCOSE: 139.9 MG/DL
HBA1C MFR BLD: 6.5 %

## 2020-03-27 LAB
AMIODARONE LEVEL: 0.6 UG/ML (ref 0.5–2)
DES-AMIOD: 0.6 UG/ML

## 2020-07-15 ENCOUNTER — TELEPHONE (OUTPATIENT)
Dept: PRIMARY CARE CLINIC | Age: 85
End: 2020-07-15

## 2020-07-15 NOTE — TELEPHONE ENCOUNTER
Pt's son is requesting an office visit follow up for pt. Can you please contact fredi Neumann with scheduling information?

## 2020-07-15 NOTE — TELEPHONE ENCOUNTER
Patient's son Angelo Iraheta said pt has not been in for a check up since February. Given her age, he thinks she needs to be checked even though she is not having any problems currently. He does not know if she should come in or have a virtual visit because of her age and Covid situation. Please advise.        Claudia Dawson:  [phone]

## 2020-07-24 ENCOUNTER — OFFICE VISIT (OUTPATIENT)
Dept: PRIMARY CARE CLINIC | Age: 85
End: 2020-07-24
Payer: COMMERCIAL

## 2020-07-24 VITALS
BODY MASS INDEX: 34.8 KG/M2 | HEIGHT: 58 IN | WEIGHT: 165.8 LBS | DIASTOLIC BLOOD PRESSURE: 72 MMHG | HEART RATE: 76 BPM | OXYGEN SATURATION: 98 % | SYSTOLIC BLOOD PRESSURE: 144 MMHG | TEMPERATURE: 97.1 F

## 2020-07-24 PROCEDURE — 99214 OFFICE O/P EST MOD 30 MIN: CPT | Performed by: INTERNAL MEDICINE

## 2020-07-24 NOTE — PROGRESS NOTES
2020     Rivka Ramírez (:  1921) is a 80 y.o. female, here for evaluation of the following medical concerns:    Diabetes   She presents for her follow-up diabetic visit. She has type 2 diabetes mellitus. Her disease course has been stable. There are no hypoglycemic associated symptoms. Pertinent negatives for hypoglycemia include no confusion, dizziness, headaches, nervousness/anxiousness, pallor, seizures, speech difficulty or tremors. There are no diabetic associated symptoms. Pertinent negatives for diabetes include no chest pain, no fatigue and no weakness. Symptoms are stable. Diabetic complications include nephropathy. Pertinent negatives for diabetic complications include no CVA or peripheral neuropathy. Risk factors for coronary artery disease include diabetes mellitus, dyslipidemia and hypertension. She is compliant with treatment all of the time. She is following a generally healthy diet. Meal planning includes avoidance of concentrated sweets. She participates in exercise intermittently. There is no change in her home blood glucose trend. An ACE inhibitor/angiotensin II receptor blocker is contraindicated.        Patient Active Problem List   Diagnosis    Primary osteoarthritis of left knee    Coronary artery disease involving native coronary artery of native heart without angina pectoris    Essential hypertension    CKD (chronic kidney disease) stage 4, GFR 15-29 ml/min ([facility])    Secondary renal hyperparathyroidism ([address]    Type 2 diabetes mellitus with complication, without long-term current use of insulin ([facility])    History of myocardial infarction    Mixed hyperlipidemia    Foreign body of abdominal wall    Anemia    CKD (chronic kidney disease)    Diabetes ([address]    Hyperlipidemia    Hypertension    Vitamin D deficiency    Shortened AL interval    Intramural leiomyoma of uterus    Decreased hearing, bilateral    Sensorineural hearing loss, bilateral       Allergies Allergen Reactions    No Known Allergies        Review of Systems   Constitutional: Negative. Negative for activity change, appetite change, chills, diaphoresis, fatigue and fever. HENT: Negative for dental problem, ear pain, hearing loss, mouth sores, nosebleeds, postnasal drip, rhinorrhea, sore throat, trouble swallowing and voice change. Eyes: Negative for photophobia, pain, discharge, redness, itching and visual disturbance. Respiratory: Negative for apnea, cough, choking, chest tightness, shortness of breath, wheezing and stridor. Cardiovascular: Negative for chest pain, palpitations and leg swelling. Hypertension hyperlipidemia. Remote history of coronary artery bypass surgery. Gastrointestinal: Negative for abdominal distention, abdominal pain, anal bleeding, blood in stool, constipation, diarrhea, nausea, rectal pain and vomiting. History of foreign body abdominal wall high risk surgical patient so will need to take doxycycline for life for infectious disease and this keep it under control. Previously very ill and hospitalized several years ago from infection. Endocrine:        Type II diabetes not insulin requiring  hyperlipidemia   Genitourinary: Negative for dysuria, flank pain, frequency and hematuria. Stage 4 chronic kidney disease   Musculoskeletal: Negative for arthralgias, back pain, gait problem, joint swelling, myalgias, neck pain and neck stiffness. Generalized osteoarthritis       Skin: Negative for color change, pallor, rash and wound. Neurological: Negative for dizziness, tremors, seizures, syncope, facial asymmetry, speech difficulty, weakness, light-headedness, numbness and headaches. Hematological: Negative for adenopathy. Does not bruise/bleed easily. Psychiatric/Behavioral: Negative for agitation, behavioral problems, confusion, decreased concentration, dysphoric mood, hallucinations, self-injury, sleep disturbance and suicidal ideas.  The General: No scleral icterus. Right eye: No discharge. Left eye: No discharge. Conjunctiva/sclera: Conjunctivae normal.      Pupils: Pupils are equal, round, and reactive to light. Neck:      Musculoskeletal: Normal range of motion and neck supple. Thyroid: No thyromegaly. Vascular: No JVD. Trachea: No tracheal deviation. Cardiovascular:      Rate and Rhythm: Normal rate and regular rhythm. Pulses:           Carotid pulses are 0 on the right side and 0 on the left side. Heart sounds: Normal heart sounds. No murmur. No gallop. Pulmonary:      Effort: Pulmonary effort is normal. No respiratory distress. Breath sounds: Normal breath sounds. No wheezing or rales. Chest:      Chest wall: No tenderness. Abdominal:      General: Bowel sounds are normal. There is no distension. Palpations: Abdomen is soft. There is no mass. Tenderness: There is no abdominal tenderness. There is no guarding or rebound. Musculoskeletal: Normal range of motion. General: No tenderness. Lymphadenopathy:      Cervical: No cervical adenopathy. Comments: No adenopathy of cervical, supraclavicular, axillary or inguinal nodes. Skin:     General: Skin is warm and dry. Coloration: Skin is not pale. Findings: No erythema or rash. Neurological:      Mental Status: She is alert and oriented to person, place, and time. Cranial Nerves: No cranial nerve deficit. Motor: No abnormal muscle tone. Coordination: Coordination normal.      Deep Tendon Reflexes: Reflexes are normal and symmetric. Reflexes normal.      Comments: Vibratory sensation intact. Psychiatric:         Mood and Affect: Mood normal.         Behavior: Behavior normal.         Thought Content: Thought content normal.         Judgment: Judgment normal.         ASSESSMENT/PLAN:   Diagnosis Orders   1. CKD (chronic kidney disease) stage 4, GFR 15-29 ml/min ([facility]) doing well.  Avoiding

## 2020-07-27 ASSESSMENT — ENCOUNTER SYMPTOMS
COLOR CHANGE: 0
APNEA: 0
SORE THROAT: 0
CONSTIPATION: 0
CHOKING: 0
ANAL BLEEDING: 0
BACK PAIN: 0
NAUSEA: 0
SHORTNESS OF BREATH: 0
RHINORRHEA: 0
ABDOMINAL DISTENTION: 0
TROUBLE SWALLOWING: 0
EYE ITCHING: 0
DIARRHEA: 0
CHEST TIGHTNESS: 0
VOMITING: 0
COUGH: 0
ABDOMINAL PAIN: 0
BLOOD IN STOOL: 0
PHOTOPHOBIA: 0
STRIDOR: 0
RECTAL PAIN: 0
EYE DISCHARGE: 0
EYE REDNESS: 0
EYE PAIN: 0
VOICE CHANGE: 0
WHEEZING: 0

## 2020-08-27 RX ORDER — CLOPIDOGREL BISULFATE 75 MG/1
75 TABLET ORAL DAILY
Qty: 90 TABLET | Refills: 1 | Status: SHIPPED | OUTPATIENT
Start: 2020-08-27 | End: 2021-03-03

## 2020-09-09 LAB
ALBUMIN SERPL-MCNC: 3.7 G/DL (ref 3.5–5.7)
ANION GAP SERPL CALCULATED.3IONS-SCNC: 14 MMOL/L (ref 3–16)
BUN BLDV-MCNC: 37 MG/DL (ref 7–25)
CALCIUM SERPL-MCNC: 9.7 MG/DL (ref 8.6–10.3)
CHLORIDE BLD-SCNC: 105 MMOL/L (ref 98–110)
CHOLESTEROL, TOTAL: 226 MG/DL (ref 0–200)
CO2: 22 MMOL/L (ref 21–33)
CREAT SERPL-MCNC: 1.82 MG/DL (ref 0.6–1.3)
GFR, ESTIMATED: 23 SEE NOTE.
GFR, ESTIMATED: 26 SEE NOTE.
GLUCOSE BLD-MCNC: 141 MG/DL (ref 70–100)
HBA1C MFR BLD: 7.1 % (ref 4–5.6)
HDLC SERPL-MCNC: 81 MG/DL (ref 60–92)
LDL CHOLESTEROL CALCULATED: 120 MG/DL
OSMOLALITY CALCULATION: 303 MOSM/KG (ref 278–305)
PHOSPHORUS: 3.2 MG/DL (ref 2.1–4.5)
POTASSIUM SERPL-SCNC: 4.1 MMOL/L (ref 3.5–5.3)
SODIUM BLD-SCNC: 141 MMOL/L (ref 133–146)
TRIGL SERPL-MCNC: 126 MG/DL (ref 10–149)
TSH, 3RD GENERATION: 4.79 UIU/ML (ref 0.45–4.12)

## 2020-09-10 LAB
BILIRUBIN URINE: NEGATIVE
CLARITY: CLEAR
COLOR: YELLOW
CREATININE URINE: 99.1 MG/DL
ERYTHROCYTES URINE: NEGATIVE
GLUCOSE URINE: NEGATIVE MG/DL
KETONES, URINE: NEGATIVE MG/DL
LEUKOCYTE ESTERASE, URINE: NEGATIVE
MICROALBUMIN UR-MCNC: 7.4 MG/L (ref 0–17)
MICROALBUMIN/CREAT UR-RTO: 7.5 MG/G (ref 0–30)
NITRITE, URINE: NEGATIVE
POC PH ARTERIAL: 6 (ref 5–8)
PROTEIN UA: NEGATIVE MG/DL
SPECIFIC GRAVITY UA: 1.02 (ref 1–1.03)
UROBILINOGEN, URINE: <2 MG/DL (ref 0.2–1.9)

## 2020-09-15 LAB
AMIODARONE, SERUM: 1.1 UG/ML (ref 1–2.5)
N-DESETHYL-AMIODARONE: 0.8 UG/ML (ref 1–2.5)

## 2020-09-20 PROBLEM — E03.9 ACQUIRED HYPOTHYROIDISM: Status: ACTIVE | Noted: 2020-09-20

## 2020-10-05 NOTE — TELEPHONE ENCOUNTER
Medication:   Requested Prescriptions     Pending Prescriptions Disp Refills    Cholecalciferol (VITAMIN D3) 50 MCG (2000 UT) CAPS [Pharmacy Med Name: VITAMIN D3 2,000UNIT CAPSULES] 30 capsule 5     Sig: TAKE 1 CAPSULE BY MOUTH EVERY DAY        Last Filled:      Patient Phone Number: [phone] (home)     Last appt: 7/24/2020   Next appt: 10/22/2020    Last OARRS: No flowsheet data found.
normal...

## 2020-10-06 ENCOUNTER — TELEPHONE (OUTPATIENT)
Dept: PRIMARY CARE CLINIC | Age: 85
End: 2020-10-06

## 2020-10-06 RX ORDER — ACETAMINOPHEN 160 MG
TABLET,DISINTEGRATING ORAL
Qty: 30 CAPSULE | Refills: 5 | Status: SHIPPED | OUTPATIENT
Start: 2020-10-06 | End: 2021-03-22 | Stop reason: SDUPTHER

## 2020-10-06 NOTE — TELEPHONE ENCOUNTER
----- Message from Lord Ardon sent at 10/5/2020  4:44 PM EDT -----  Subject: Message to Provider    QUESTIONS  Information for Provider? Pt  called to inquire about the   medication Dr. Susan Segundo was supposed to send over. would like return call   ---------------------------------------------------------------------------  --------------  CALL BACK INFO  What is the best way for the office to contact you? OK to leave message on   voicemail  Preferred Call Back Phone Number? [phone]  ---------------------------------------------------------------------------  --------------  SCRIPT ANSWERS  Relationship to Patient? Other  Representative Name?  Hector Aroldoefrain   Is the Representative on the appropriate HIPAA document in Epic?  Yes

## 2020-10-19 ENCOUNTER — TELEPHONE (OUTPATIENT)
Dept: PRIMARY CARE CLINIC | Age: 85
End: 2020-10-19

## 2020-10-19 RX ORDER — PITAVASTATIN CALCIUM 2.09 MG/1
2 TABLET, FILM COATED ORAL NIGHTLY
Qty: 30 TABLET | Refills: 5 | Status: SHIPPED | OUTPATIENT
Start: 2020-10-19 | End: 2020-10-22

## 2020-10-19 NOTE — TELEPHONE ENCOUNTER
----- Message from Gilford Starring sent at 10/15/2020  1:36 PM EDT -----  Subject: Message to Provider    QUESTIONS  Information for Provider? Patient's son Ashlyn Lieberman called to see if the new   prescription Livalo has been sent to the [facility] on [address] [facility] and [facility] dont [facility] yet.  ---------------------------------------------------------------------------  --------------  CALL BACK INFO  What is the best way for the office to contact you? OK to leave message on   voicemail  Preferred Call Back Phone Number? [phone]  ---------------------------------------------------------------------------  --------------  SCRIPT ANSWERS  Relationship to Patient?  Self

## 2020-10-20 ENCOUNTER — TELEPHONE (OUTPATIENT)
Dept: PRIMARY CARE CLINIC | Age: 85
End: 2020-10-20

## 2020-10-20 RX ORDER — ATORVASTATIN CALCIUM 40 MG/1
40 TABLET, FILM COATED ORAL DAILY
Qty: 90 TABLET | Refills: 1 | Status: SHIPPED | OUTPATIENT
Start: 2020-10-20 | End: 2021-04-21

## 2020-10-22 ENCOUNTER — OFFICE VISIT (OUTPATIENT)
Dept: PRIMARY CARE CLINIC | Age: 85
End: 2020-10-22
Payer: COMMERCIAL

## 2020-10-22 VITALS
DIASTOLIC BLOOD PRESSURE: 79 MMHG | TEMPERATURE: 98.1 F | OXYGEN SATURATION: 98 % | WEIGHT: 158 LBS | BODY MASS INDEX: 33.17 KG/M2 | HEIGHT: 58 IN | HEART RATE: 88 BPM | SYSTOLIC BLOOD PRESSURE: 164 MMHG

## 2020-10-22 PROCEDURE — 99214 OFFICE O/P EST MOD 30 MIN: CPT | Performed by: INTERNAL MEDICINE

## 2020-10-22 PROCEDURE — 90694 VACC AIIV4 NO PRSRV 0.5ML IM: CPT | Performed by: INTERNAL MEDICINE

## 2020-10-22 PROCEDURE — 3051F HG A1C>EQUAL 7.0%<8.0%: CPT | Performed by: INTERNAL MEDICINE

## 2020-10-22 PROCEDURE — 90471 IMMUNIZATION ADMIN: CPT | Performed by: INTERNAL MEDICINE

## 2020-10-22 RX ORDER — AMLODIPINE BESYLATE 5 MG/1
7.5 TABLET ORAL DAILY
Qty: 135 TABLET | Refills: 3 | Status: SHIPPED | OUTPATIENT
Start: 2020-10-22 | End: 2021-10-01

## 2020-10-22 ASSESSMENT — PATIENT HEALTH QUESTIONNAIRE - PHQ9
SUM OF ALL RESPONSES TO PHQ QUESTIONS 1-9: 0
SUM OF ALL RESPONSES TO PHQ9 QUESTIONS 1 & 2: 0
SUM OF ALL RESPONSES TO PHQ QUESTIONS 1-9: 0
1. LITTLE INTEREST OR PLEASURE IN DOING THINGS: 0
SUM OF ALL RESPONSES TO PHQ QUESTIONS 1-9: 0
2. FEELING DOWN, DEPRESSED OR HOPELESS: 0

## 2020-10-22 NOTE — PROGRESS NOTES
10/22/2020     Dilia Reinoso (:  1921) is a 80 y.o. female, here for evaluation of the following medical concerns:    HPI      Diagnosis Orders   1. Mixed hyperlipidemia has been present for years. Recent LDL of 120. Patient has a history of CAD that has been under good control with atorvastatin 40 and aspirin 81 mg daily. Statin therapy was discontinued a few months ago. Explain to family could continue on just diet will restart statin therapy since she tolerated well. With advanced age not looking for longevity but to have patient continuing the quality of life he has now. If coronary artery disease were to progress or peripheral artery disease are this would adversely affect the quality of life. She never had elevated CK or myalgias or weakness with statin therapy. 2. Essential hypertension not controlled with systolic in the mid 606R. No headaches or dizziness. No TIA symptoms. Currently taking amlodipine 5 mg daily. Patient has complication of renal disease, stage III. Goal is to get the systolic pressure 148.     3. Need for influenza vaccination     4. Acquired hypothyroidism   Lab Results   Component Value Date    TSHFT4 4.85 (H) 2020    patient is not on thyroid supplement. Some authorities have discuss elevating the normal range of TSH for seniors. Patient is not symptomatic with dry skin or fatigue or leg edema or hair changes. Will need to continue to monitor. 5. Type 2 diabetes mellitus with complication, without long-term current use of insulin ([address] present for years and control with diet serious patient denies polydipsia or polyuria.          Patient Active Problem List   Diagnosis    Primary osteoarthritis of left knee    Coronary artery disease involving native coronary artery of native heart without angina pectoris    Essential hypertension    CKD (chronic kidney disease) stage 4, GFR 15-29 ml/min ([facility])    Secondary renal hyperparathyroidism ([address]    Type 2 diabetes mellitus with complication, without long-term current use of insulin ([facility])    History of myocardial infarction    Mixed hyperlipidemia    Foreign body of abdominal wall    Anemia    CKD (chronic kidney disease)    [facility]    Hyperlipidemia    Hypertension    Vitamin D deficiency    Shortened [place] interval    Intramural leiomyoma of uterus    Decreased hearing, bilateral    Sensorineural hearing loss, bilateral    Acquired hypothyroidism     Allergies   Allergen Reactions    No Known Allergies          Review of Systems   Constitutional: Negative. Negative for activity change, appetite change, chills, diaphoresis, fatigue and fever. HENT: Negative for dental problem, ear pain, hearing loss, mouth sores, nosebleeds, postnasal drip, rhinorrhea, sore throat, trouble swallowing and voice change. Eyes: Negative for photophobia, pain, discharge, redness, itching and visual disturbance. Respiratory: Negative for apnea, cough, choking, chest tightness, shortness of breath, wheezing and stridor. Cardiovascular: Negative for chest pain, palpitations and leg swelling. Hypertension hyperlipidemia. Remote history of coronary artery bypass surgery. Gastrointestinal: Negative for abdominal distention, abdominal pain, anal bleeding, blood in stool, constipation, diarrhea, nausea, rectal pain and vomiting. History of foreign body abdominal wall high risk surgical patient so will need to take doxycycline for life for infectious disease and this keep it under control. Previously very ill and hospitalized several years ago from infection. Endocrine:        Type II diabetes not insulin requiring  hyperlipidemia   Genitourinary: Negative for dysuria, flank pain, frequency and hematuria. Stage 4 chronic kidney disease   Musculoskeletal: Negative for arthralgias, back pain, gait problem, joint swelling, myalgias, neck pain and neck stiffness.         Generalized osteoarthritis Skin: Negative for color change, pallor, rash and wound. Neurological: Negative for dizziness, tremors, seizures, syncope, facial asymmetry, speech difficulty, weakness, light-headedness, numbness and headaches. Hematological: Negative for adenopathy. Does not bruise/bleed easily. Psychiatric/Behavioral: Negative for agitation, behavioral problems, confusion, decreased concentration, dysphoric mood, hallucinations, self-injury, sleep disturbance and suicidal ideas. The patient is not nervous/anxious and is not hyperactive. Prior to Visit Medications    Medication Sig Taking? Authorizing Provider   atorvastatin (LIPITOR) 40 MG tablet Take 1 tablet by mouth daily Yes Mary Mane MD   Cholecalciferol (VITAMIN D3) 50 MCG (2000 UT) CAPS TAKE 1 CAPSULE BY MOUTH EVERY DAY Yes Mary Mane MD   clopidogrel (PLAVIX) 75 MG tablet TAKE 1 TABLET BY MOUTH DAILY Yes Mary Mane MD   omeprazole (PRILOSEC OTC) 20 MG tablet Take 1 tablet by mouth daily Yes Mary Mane MD   isosorbide mononitrate (IMDUR) 60 MG extended release tablet Take 1 tablet by mouth daily Yes Mary Mane MD   amLODIPine (NORVASC) 5 MG tablet Take 1 tablet by mouth daily Yes Mary Mane MD   Lactobacillus (PROBIOTIC ACIDOPHILUS) TABS Take 1 tablet by mouth daily Yes Mary Mane MD   amiodarone (CORDARONE) 200 MG tablet Take 0.5 tablets by mouth daily disreguard the previous script only taking 100 mg daily.  Yes Mary Mane MD   doxycycline hyclate (VIBRA-TABS) 100 MG tablet TAKE 1 TABLET BY MOUTH TWICE DAILY Yes Fatuma Galindo MD        Social History     Tobacco Use    Smoking status: Never Smoker    Smokeless tobacco: Never Used   Substance Use Topics    Alcohol use: Not Currently        Vitals:    10/22/20 1144 10/22/20 1150   BP: (!) 164/73 (!) 164/79   Pulse: 88    Temp: 98.1 °F (36.7 °C)    TempSrc: Oral    SpO2: 98%    Weight: 158 lb (71.7 kg) Height: 4' 10\" (1.473 m)      Estimated body mass index is 33.02 kg/m² as calculated from the following:    Height as of this encounter: 4' 10\" (1.473 m). Weight as of this encounter: 158 lb (71.7 kg). Physical Exam  Constitutional:       General: She is not in acute distress. Appearance: She is well-developed. She is not diaphoretic. HENT:      Head: Normocephalic and atraumatic. Right Ear: External ear normal.      Left Ear: External ear normal.      Nose: Nose normal.      Mouth/Throat:      Pharynx: No oropharyngeal exudate. Eyes:      General: No scleral icterus. Right eye: No discharge. Left eye: No discharge. Conjunctiva/sclera: Conjunctivae normal.      Pupils: Pupils are equal, round, and reactive to light. Neck:      Musculoskeletal: Normal range of motion and neck supple. Thyroid: No thyromegaly. Vascular: No JVD. Trachea: No tracheal deviation. Cardiovascular:      Rate and Rhythm: Normal rate and regular rhythm. Pulses:           Carotid pulses are 0 on the right side and 0 on the left side. Heart sounds: Normal heart sounds. No murmur. No gallop. Pulmonary:      Effort: Pulmonary effort is normal. No respiratory distress. Breath sounds: Normal breath sounds. No wheezing or rales. Chest:      Chest wall: No tenderness. Abdominal:      General: Bowel sounds are normal. There is no distension. Palpations: Abdomen is soft. There is no mass. Tenderness: There is no abdominal tenderness. There is no guarding or rebound. Musculoskeletal: Normal range of motion. General: No tenderness. Lymphadenopathy:      Cervical: No cervical adenopathy. Comments: No adenopathy of cervical, supraclavicular, axillary or inguinal nodes. Skin:     General: Skin is warm and dry. Coloration: Skin is not pale. Findings: No erythema or rash.    Neurological:      Mental Status: She is alert and oriented to person, place, and time. Cranial Nerves: No cranial nerve deficit. Motor: No abnormal muscle tone. Coordination: Coordination normal.      Deep Tendon Reflexes: Reflexes are normal and symmetric. Reflexes normal.      Comments: Vibratory sensation intact. Psychiatric:         Mood and Affect: Mood normal.         Behavior: Behavior normal.         Thought Content: Thought content normal.         Judgment: Judgment normal.         ASSESSMENT/PLAN:   Diagnosis Orders   1. Mixed hyperlipidemia   Lab Results   Component Value Date    CHOL 226 (H) 09/09/2020     Lab Results   Component Value Date    TRIG 126 09/09/2020     Lab Results   Component Value Date    HDL 81 09/09/2020     Lab Results   Component Value Date    LDLCALC 120 09/09/2020     No results found for: LABVLDL, VLDL  No results found for: CHOLHDLRATIO start atorvastatin 40 mg daily. Patient has taken three years and tolerated well. With her history of CAD it would be unwise to discontinue with because it could adversely affect the quality of her life. Lipid Panel   2. Essential hypertension not at goal which is a systolic of 466. Will increase amlodipine 5-7.5 mg daily. BP Readings from Last 3 Encounters:   10/22/20 (!) 164/79   07/24/20 (!) 144/72   03/11/20 (!) 169/85    amLODIPine (NORVASC) 5 MG tablet   3. Need for influenza vaccination  INFLUENZA, QUADV, ADJUVANTED, 65 YRS =, IM, PF, PREFILL SYR, 0.5ML ([facility])   4. Acquired hypothyroidism actually for patient is advanced age this TSH is acceptable because she would be in more danger from over supplementation. Lab Results   Component Value Date    TSHFT4 4.85 (H) 03/24/2020         5. Type 2 diabetes mellitus with complication, without long-term current use of insulin ([facility]) controlled on current regimen. Continue.   Lab Results   Component Value Date    LABA1C 7.1 (H) 09/09/2020    LABA1C 6.5 03/24/2020     Lab Results   Component Value Date    LABMICR 7.4 09/10/2020    LDLCALC 120 09/09/2020    CREATININE 1.82 (H) 09/09/2020             An electronic signature was used to authenticate this note.     --Justine Blas MD on 10/22/2020 at 12:51 PM

## 2020-10-26 ASSESSMENT — ENCOUNTER SYMPTOMS
VOMITING: 0
ABDOMINAL DISTENTION: 0
SHORTNESS OF BREATH: 0
COLOR CHANGE: 0
EYE ITCHING: 0
CHEST TIGHTNESS: 0
RHINORRHEA: 0
APNEA: 0
ANAL BLEEDING: 0
CHOKING: 0
SORE THROAT: 0
WHEEZING: 0
DIARRHEA: 0
COUGH: 0
NAUSEA: 0
BLOOD IN STOOL: 0
TROUBLE SWALLOWING: 0
RECTAL PAIN: 0
CONSTIPATION: 0
STRIDOR: 0
ABDOMINAL PAIN: 0
EYE PAIN: 0
EYE DISCHARGE: 0
BACK PAIN: 0
EYE REDNESS: 0
PHOTOPHOBIA: 0
VOICE CHANGE: 0

## 2020-11-03 PROBLEM — I10 ESSENTIAL HYPERTENSION: Status: RESOLVED | Noted: 2020-02-28 | Resolved: 2020-11-03

## 2020-11-04 LAB
ALBUMIN SERPL-MCNC: 4 G/DL (ref 3.5–5.7)
ANION GAP SERPL CALCULATED.3IONS-SCNC: 11 MMOL/L (ref 3–16)
BUN BLDV-MCNC: 42 MG/DL (ref 7–25)
CALCIUM SERPL-MCNC: 10.1 MG/DL (ref 8.6–10.3)
CHLORIDE BLD-SCNC: 105 MMOL/L (ref 98–110)
CHOLESTEROL, TOTAL: 186 MG/DL (ref 0–200)
CO2: 27 MMOL/L (ref 21–33)
CREAT SERPL-MCNC: 1.78 MG/DL (ref 0.6–1.3)
GFR, ESTIMATED: 23 SEE NOTE.
GFR, ESTIMATED: 27 SEE NOTE.
GLUCOSE BLD-MCNC: 206 MG/DL (ref 70–100)
HDLC SERPL-MCNC: 79 MG/DL (ref 60–92)
LDL CHOLESTEROL CALCULATED: 83 MG/DL
OSMOLALITY CALCULATION: 312 MOSM/KG (ref 278–305)
PHOSPHORUS: 3.1 MG/DL (ref 2.1–4.5)
POTASSIUM SERPL-SCNC: 4.3 MMOL/L (ref 3.5–5.3)
SODIUM BLD-SCNC: 143 MMOL/L (ref 133–146)
T4 FREE: 1.13 NG/DL (ref 0.61–1.76)
TOTAL CK: 37 U/L (ref 30–223)
TRIGL SERPL-MCNC: 119 MG/DL (ref 10–149)
TSH, 3RD GENERATION: 5.62 UIU/ML (ref 0.45–4.12)

## 2020-11-06 PROBLEM — I21.9 MYOCARDIAL INFARCTION (HCC): Status: ACTIVE | Noted: 2020-11-06

## 2020-11-25 ENCOUNTER — OFFICE VISIT (OUTPATIENT)
Dept: PRIMARY CARE CLINIC | Age: 85
End: 2020-11-25
Payer: COMMERCIAL

## 2020-11-25 VITALS
HEART RATE: 72 BPM | SYSTOLIC BLOOD PRESSURE: 140 MMHG | TEMPERATURE: 96.4 F | HEIGHT: 58 IN | WEIGHT: 158 LBS | DIASTOLIC BLOOD PRESSURE: 76 MMHG | BODY MASS INDEX: 33.17 KG/M2 | OXYGEN SATURATION: 100 %

## 2020-11-25 PROCEDURE — 99214 OFFICE O/P EST MOD 30 MIN: CPT | Performed by: INTERNAL MEDICINE

## 2020-11-25 PROCEDURE — 90732 PPSV23 VACC 2 YRS+ SUBQ/IM: CPT | Performed by: INTERNAL MEDICINE

## 2020-11-25 PROCEDURE — G0009 ADMIN PNEUMOCOCCAL VACCINE: HCPCS | Performed by: INTERNAL MEDICINE

## 2020-11-25 PROCEDURE — 3051F HG A1C>EQUAL 7.0%<8.0%: CPT | Performed by: INTERNAL MEDICINE

## 2020-11-25 RX ORDER — AMIODARONE HYDROCHLORIDE 100 MG/1
100 TABLET ORAL DAILY
COMMUNITY
Start: 2020-10-18 | End: 2021-04-21 | Stop reason: SDUPTHER

## 2020-11-25 ASSESSMENT — ENCOUNTER SYMPTOMS
CONSTIPATION: 0
APNEA: 0
EYE DISCHARGE: 0
EYE REDNESS: 0
COUGH: 0
BLOOD IN STOOL: 0
NAUSEA: 0
EYE PAIN: 0
VOMITING: 0
TROUBLE SWALLOWING: 0
PHOTOPHOBIA: 0
EYE ITCHING: 0
DIARRHEA: 0
COLOR CHANGE: 0
ABDOMINAL DISTENTION: 0
SHORTNESS OF BREATH: 0
VOICE CHANGE: 0
STRIDOR: 0
CHEST TIGHTNESS: 0
WHEEZING: 0
RECTAL PAIN: 0
ABDOMINAL PAIN: 0
SORE THROAT: 0
ANAL BLEEDING: 0
RHINORRHEA: 0
BACK PAIN: 0
CHOKING: 0

## 2020-11-25 ASSESSMENT — PATIENT HEALTH QUESTIONNAIRE - PHQ9
SUM OF ALL RESPONSES TO PHQ QUESTIONS 1-9: 0
1. LITTLE INTEREST OR PLEASURE IN DOING THINGS: 0
2. FEELING DOWN, DEPRESSED OR HOPELESS: 0
SUM OF ALL RESPONSES TO PHQ QUESTIONS 1-9: 0
SUM OF ALL RESPONSES TO PHQ QUESTIONS 1-9: 0
SUM OF ALL RESPONSES TO PHQ9 QUESTIONS 1 & 2: 0

## 2020-11-25 NOTE — PROGRESS NOTES
2020     Caleb Lopez (:  1921) is a 80 y.o. female, here for evaluation of the following medical concerns:    HPI   Diagnosis Orders   1. Stage 3b chronic kidney disease chronic kidney disease stable. Patient encouraged to drink more water no itching nausea or decrease in appetite and no leg edema. Labs Renal Latest Ref Rng & Units 2020 2020 3/24/2020   BUN 7 - 25 mg/dL 42(H) 37(H) 34(H)   Cr 0.60 - 1.30 mg/dL 1.78(H) 1.82(H) 1.5(H)   K 3.5 - 5.3 mmol/L 4.3 4.1 4.2   Na 133 - 146 mmol/L 143 141 134(L)            2. Acquired hypothyroidism present for years and clinically you thyroid on no medication. An albuterol and associated with hypothyroidism. Patient values are borderline so will monitor with no symptoms. No cold intolerance. Hearing skin problems. Lab Results   Component Value Date    TSHFT4 4.85 (H) 2020         3. Type 2 diabetes mellitus with complication, without long-term current use of insulin (HCC)   Lab Results   Component Value Date    LABA1C 7.1 (H) 2020    LABA1C 6.5 2020     Lab Results   Component Value Date    LABMICR 7.4 09/10/2020    LDLCALC 83 2020    CREATININE 1.78 (H) 2020         4. Mixed hyperlipidemia   Lab Results   Component Value Date    CHOL 186 2020    CHOL 226 (H) 2020     Lab Results   Component Value Date    TRIG 119 2020    TRIG 126 2020     Lab Results   Component Value Date    HDL 79 2020    HDL 81 2020     Lab Results   Component Value Date    LDLCALC 83 2020    LDLCALC 120 2020     No results found for: LABVLDL, VLDL  No results found for: CHOLHDLRATIO no myalgias or weakness with atorvastatin 40 mg daily. Past history of MI. No chest pain shortness of breath and no TIA or PAD symptoms. Hyperlipidemia present for years. 5. Paroxysmal atrial fibrillation (HCC) heart rate controlled no palpitations or dyspnea on exertion on amiodarone had milligrams daily.  No leg edema orthopnea. Pulse Readings from Last 3 Encounters:   11/25/20 72   10/22/20 88   07/24/20 76         6. Need for vaccination against Streptococcus pneumoniae  PNEUMOVAX 23 subcutaneous/IM (Pneumococcal polysaccharide vaccine 23-valent >= 3yo)     Patient Active Problem List   Diagnosis    Primary osteoarthritis of left knee    Coronary artery disease involving native coronary artery of native heart without angina pectoris    CKD (chronic kidney disease) stage 4, GFR 15-29 ml/min ([facility])    Secondary renal hyperparathyroidism ([facility] [address]    Type 2 diabetes mellitus with complication, without long-term current use of insulin (formerly [facility])    History of myocardial infarction    Mixed hyperlipidemia    Foreign body of abdominal wall    Anemia    CKD (chronic kidney disease)    Diabetes ([address]    Hyperlipidemia    Hypertension    Vitamin D deficiency    Shortened VA interval    Intramural leiomyoma of uterus    Decreased hearing, bilateral    Sensorineural hearing loss, bilateral    Acquired hypothyroidism    Myocardial infarction (formerly [facility])     Allergies   Allergen Reactions    No Known Allergies          Review of Systems   Constitutional: Negative. Negative for activity change, appetite change, chills, diaphoresis, fatigue and fever. HENT: Negative for dental problem, ear pain, hearing loss, mouth sores, nosebleeds, postnasal drip, rhinorrhea, sore throat, trouble swallowing and voice change. Eyes: Negative for photophobia, pain, discharge, redness, itching and visual disturbance. Respiratory: Negative for apnea, cough, choking, chest tightness, shortness of breath, wheezing and stridor. Cardiovascular: Negative for chest pain, palpitations and leg swelling. Hypertension hyperlipidemia. Remote history of coronary artery bypass surgery. Gastrointestinal: Negative for abdominal distention, abdominal pain, anal bleeding, blood in stool, constipation, diarrhea, nausea, rectal pain and vomiting. History of foreign body abdominal wall high risk surgical patient so will need to take doxycycline for life for infectious disease and this keep it under control. Previously very ill and hospitalized several years ago from infection. Endocrine:        Type II diabetes not insulin requiring  hyperlipidemia   Genitourinary: Negative for dysuria, flank pain, frequency and hematuria. Stage 4 chronic kidney disease   Musculoskeletal: Negative for arthralgias, back pain, gait problem, joint swelling, myalgias, neck pain and neck stiffness. Generalized osteoarthritis       Skin: Negative for color change, pallor, rash and wound. Neurological: Negative for dizziness, tremors, seizures, syncope, facial asymmetry, speech difficulty, weakness, light-headedness, numbness and headaches. Hematological: Negative for adenopathy. Does not bruise/bleed easily. Psychiatric/Behavioral: Negative for agitation, behavioral problems, confusion, decreased concentration, dysphoric mood, hallucinations, self-injury, sleep disturbance and suicidal ideas. The patient is not nervous/anxious and is not hyperactive. Prior to Visit Medications    Medication Sig Taking?  Authorizing Provider   amLODIPine (NORVASC) 5 MG tablet Take 1.5 tablets by mouth daily Yes Shadia Louis MD   atorvastatin (LIPITOR) 40 MG tablet Take 1 tablet by mouth daily Yes Shadia Louis MD   Cholecalciferol (VITAMIN D3) 50 MCG (2000 UT) CAPS TAKE 1 CAPSULE BY MOUTH EVERY DAY Yes Shadia Louis MD   clopidogrel (PLAVIX) 75 MG tablet TAKE 1 TABLET BY MOUTH DAILY Yes Shadia Louis MD   omeprazole (PRILOSEC OTC) 20 MG tablet Take 1 tablet by mouth daily Yes Shadia Louis MD   isosorbide mononitrate (IMDUR) 60 MG extended release tablet Take 1 tablet by mouth daily Yes Shadia Louis MD   Lactobacillus (PROBIOTIC ACIDOPHILUS) TABS Take 1 tablet by mouth daily Yes Shadia Louis MD amiodarone (CORDARONE) 200 MG tablet Take 0.5 tablets by mouth daily disreguard the previous script only taking 100 mg daily. Yes Nabil Fregoso MD   doxycycline hyclate (VIBRA-TABS) 100 MG tablet TAKE 1 TABLET BY MOUTH TWICE DAILY Yes Milbert Bernheim, MD        Social History     Tobacco Use    Smoking status: Never Smoker    Smokeless tobacco: Never Used   Substance Use Topics    Alcohol use: Not Currently        Vitals:    11/25/20 0805 11/25/20 0810   BP: (!) 160/89 (!) 162/84   Pulse: 98    Temp: 96.4 °F (35.8 °C)    TempSrc: Oral    SpO2: 100%    Weight: 158 lb (71.7 kg)    Height: 4' 10\" (1.473 m)      Estimated body mass index is 33.02 kg/m² as calculated from the following:    Height as of this encounter: 4' 10\" (1.473 m). Weight as of this encounter: 158 lb (71.7 kg). Physical Exam  Constitutional:       General: She is not in acute distress. Appearance: She is well-developed. She is not diaphoretic. HENT:      Head: Normocephalic and atraumatic. Right Ear: External ear normal.      Left Ear: External ear normal.      Nose: Nose normal.      Mouth/Throat:      Pharynx: No oropharyngeal exudate. Eyes:      General: No scleral icterus. Right eye: No discharge. Left eye: No discharge. Conjunctiva/sclera: Conjunctivae normal.      Pupils: Pupils are equal, round, and reactive to light. Neck:      Musculoskeletal: Normal range of motion and neck supple. Thyroid: No thyromegaly. Vascular: No JVD. Trachea: No tracheal deviation. Cardiovascular:      Rate and Rhythm: Normal rate and regular rhythm. Pulses:           Carotid pulses are 0 on the right side and 0 on the left side. Heart sounds: Normal heart sounds. No murmur. No gallop. Pulmonary:      Effort: Pulmonary effort is normal. No respiratory distress. Breath sounds: Normal breath sounds. No wheezing or rales. Chest:      Chest wall: No tenderness.    Abdominal: subcutaneous/IM (Pneumococcal polysaccharide vaccine 23-valent >= 3yo)       An electronic signature was used to authenticate this note.     --Arielle Kamara MD on 11/25/2020 at 8:20 AM

## 2020-11-30 RX ORDER — DOXYCYCLINE HYCLATE 100 MG
TABLET ORAL
Qty: 60 TABLET | Refills: 11 | Status: SHIPPED | OUTPATIENT
Start: 2020-11-30 | End: 2021-12-24

## 2020-12-11 LAB
ALBUMIN SERPL-MCNC: 4 G/DL (ref 3.5–5.7)
ALP BLD-CCNC: 126 U/L (ref 36–125)
ALT SERPL-CCNC: 16 U/L (ref 7–52)
ANION GAP SERPL CALCULATED.3IONS-SCNC: 14 MMOL/L (ref 3–16)
AST SERPL-CCNC: 18 U/L (ref 13–39)
BASOPHILS ABSOLUTE: 42 /UL (ref 0–200)
BASOPHILS RELATIVE PERCENT: 0.4 % (ref 0–1)
BILIRUB SERPL-MCNC: 0.8 MG/DL (ref 0–1.5)
BUN BLDV-MCNC: 35 MG/DL (ref 7–25)
CALCIUM SERPL-MCNC: 10 MG/DL (ref 8.6–10.3)
CHLORIDE BLD-SCNC: 104 MMOL/L (ref 98–110)
CO2: 26 MMOL/L (ref 21–33)
CREAT SERPL-MCNC: 1.95 MG/DL (ref 0.6–1.3)
CREATININE URINE: 229 MG/DL
EOSINOPHILS ABSOLUTE: 42 /UL (ref 15–500)
EOSINOPHILS RELATIVE PERCENT: 0.4 % (ref 0–8)
GFR, ESTIMATED: 21 SEE NOTE.
GFR, ESTIMATED: 24 SEE NOTE.
GLUCOSE BLD-MCNC: 229 MG/DL (ref 70–100)
HBA1C MFR BLD: 7.5 % (ref 4–5.6)
HCT VFR BLD CALC: 39.3 % (ref 35–45)
HEMOGLOBIN: 12.7 G/DL (ref 11.7–15.5)
LYMPHOCYTES ABSOLUTE: 1806 /UL (ref 850–3900)
LYMPHOCYTES RELATIVE PERCENT: 17.2 % (ref 15–45)
MCH RBC QN AUTO: 30.6 PG (ref 27–33)
MCHC RBC AUTO-ENTMCNC: 32.3 G/DL (ref 32–36)
MCV RBC AUTO: 94.9 FL (ref 80–100)
MICROALBUMIN UR-MCNC: 64.6 MG/L (ref 0–17)
MICROALBUMIN/CREAT UR-RTO: 28.2 MG/G (ref 0–30)
MONOCYTES ABSOLUTE: 693 /UL (ref 200–950)
MONOCYTES RELATIVE PERCENT: 6.6 % (ref 0–12)
NEUTROPHILS ABSOLUTE: 7917 /UL (ref 1500–7800)
NUCLEATED RED BLOOD CELLS: 0 /100 WBC (ref 0–0)
OSMOLALITY CALCULATION: 313 MOSM/KG (ref 278–305)
PDW BLD-RTO: 15.5 % (ref 11–15)
PLATELET # BLD: 219 10E3/UL (ref 140–400)
PMV BLD AUTO: 7.9 FL (ref 7.5–11.5)
POTASSIUM SERPL-SCNC: 3.8 MMOL/L (ref 3.5–5.3)
RBC # BLD: 4.14 10E6/UL (ref 3.8–5.1)
SEGMENTED NEUTROPHILS RELATIVE PERCENT: 75.4 % (ref 40–80)
SODIUM BLD-SCNC: 144 MMOL/L (ref 133–146)
T4 FREE: 1.17 NG/DL (ref 0.61–1.76)
TOTAL CK: 34 U/L (ref 30–223)
TOTAL PROTEIN: 6.7 G/DL (ref 6.4–8.9)
TSH, 3RD GENERATION: 5.23 UIU/ML (ref 0.45–4.12)
WBC # BLD: 10.5 10E3/UL (ref 3.8–10.8)

## 2020-12-15 LAB
AMIODARONE, SERUM: 1.1 UG/ML (ref 1–2.5)
N-DESETHYL-AMIODARONE: 0.6 UG/ML (ref 1–2.5)

## 2021-01-06 ENCOUNTER — OFFICE VISIT (OUTPATIENT)
Dept: PRIMARY CARE CLINIC | Age: 86
End: 2021-01-06
Payer: MEDICARE

## 2021-01-06 VITALS
HEART RATE: 85 BPM | WEIGHT: 158 LBS | TEMPERATURE: 98.2 F | OXYGEN SATURATION: 97 % | DIASTOLIC BLOOD PRESSURE: 74 MMHG | SYSTOLIC BLOOD PRESSURE: 138 MMHG | BODY MASS INDEX: 33.02 KG/M2

## 2021-01-06 DIAGNOSIS — E78.2 MIXED HYPERLIPIDEMIA: Primary | ICD-10-CM

## 2021-01-06 DIAGNOSIS — E11.8 TYPE 2 DIABETES MELLITUS WITH COMPLICATION, WITHOUT LONG-TERM CURRENT USE OF INSULIN (HCC): ICD-10-CM

## 2021-01-06 DIAGNOSIS — I25.10 CORONARY ARTERY DISEASE INVOLVING NATIVE CORONARY ARTERY OF NATIVE HEART WITHOUT ANGINA PECTORIS: ICD-10-CM

## 2021-01-06 DIAGNOSIS — E03.9 ACQUIRED HYPOTHYROIDISM: ICD-10-CM

## 2021-01-06 DIAGNOSIS — K21.00 GASTROESOPHAGEAL REFLUX DISEASE WITH ESOPHAGITIS WITHOUT HEMORRHAGE: ICD-10-CM

## 2021-01-06 DIAGNOSIS — N18.4 CKD (CHRONIC KIDNEY DISEASE) STAGE 4, GFR 15-29 ML/MIN (HCC): ICD-10-CM

## 2021-01-06 DIAGNOSIS — I10 ESSENTIAL HYPERTENSION: ICD-10-CM

## 2021-01-06 PROCEDURE — 99213 OFFICE O/P EST LOW 20 MIN: CPT | Performed by: INTERNAL MEDICINE

## 2021-01-06 RX ORDER — LEVOTHYROXINE SODIUM 0.03 MG/1
12.5 TABLET ORAL DAILY
Qty: 30 TABLET | Refills: 1 | Status: SHIPPED | OUTPATIENT
Start: 2021-01-06 | End: 2021-04-21

## 2021-01-06 RX ORDER — OMEPRAZOLE 20 MG/1
20 CAPSULE, DELAYED RELEASE ORAL DAILY PRN
Qty: 30 CAPSULE | Refills: 5 | Status: SHIPPED | OUTPATIENT
Start: 2021-01-06 | End: 2021-07-12

## 2021-01-06 RX ORDER — ISOSORBIDE MONONITRATE 60 MG/1
60 TABLET, EXTENDED RELEASE ORAL DAILY
Qty: 30 TABLET | Refills: 5 | Status: SHIPPED | OUTPATIENT
Start: 2021-01-06 | End: 2021-07-12

## 2021-01-06 RX ORDER — OMEPRAZOLE 20 MG/1
20 CAPSULE, DELAYED RELEASE ORAL DAILY
COMMUNITY
End: 2021-01-06 | Stop reason: SDUPTHER

## 2021-01-06 ASSESSMENT — PATIENT HEALTH QUESTIONNAIRE - PHQ9
SUM OF ALL RESPONSES TO PHQ QUESTIONS 1-9: 0
1. LITTLE INTEREST OR PLEASURE IN DOING THINGS: 0
SUM OF ALL RESPONSES TO PHQ QUESTIONS 1-9: 0
2. FEELING DOWN, DEPRESSED OR HOPELESS: 0
SUM OF ALL RESPONSES TO PHQ QUESTIONS 1-9: 0
SUM OF ALL RESPONSES TO PHQ9 QUESTIONS 1 & 2: 0

## 2021-01-06 NOTE — PATIENT INSTRUCTIONS
Eat blue berries and strawberries and oatmeal daily and jimmie with stevia.  Add cinnamon to oatmeal.

## 2021-01-06 NOTE — PROGRESS NOTES
Ben Devine (:  1921) is a [place] Stacie [age] y.o. female,Established patient, here for evaluation of the following chief complaint(s):  Check-Up      ASSESSMENT/PLAN:   Diagnosis Orders   1. Mixed hyperlipidemia   Lab Results   Component Value Date    CHOL 186 2020    CHOL 226 (H) 2020     Lab Results   Component Value Date    TRIG 119 2020    TRIG 126 2020     Lab Results   Component Value Date    HDL 79 2020    HDL 81 2020     Lab Results   Component Value Date    LDLCALC 83 2020    LDLCALC 120 2020     No results found for: LABVLDL, VLDL  No results found for: CHOLHDLRATIO  With history of CAD and stents, continue statin and asa. 2. Coronary artery disease involving native coronary artery of native heart without angina pectoris  With no angina, refill meds and continue asa atorvastatin and off asa for years due to gi upset. isosorbide mononitrate (IMDUR) 60 MG extended release tablet   3. Gastroesophageal reflux disease with esophagitis without hemorrhage  omeprazole (PRILOSEC) 20 MG delayed release capsule   4. Type 2 diabetes mellitus with complication, without long-term current use of insulin ([facility])   Lab Results   Component Value Date    LABA1C 7.5 (H) 2020    LABA1C 7.1 (H) 2020    LABA1C 6.5 2020     Lab Results   Component Value Date    LABMICR 64.6 (H) 2020    LDLCALC 83 2020    CREATININE 1.95 (H) 2020     Eating a lot of sweets, will substitute with fresh or frozen fruits sweetened with stevia. 5. Essential hypertension   BP Readings from Last 3 Encounters:   21 138/74   20 (!) 140/76   10/22/20 (!) 164/79   controlled on current meds. omeprazole (PRILOSEC) 20 MG delayed release capsule   6. CKD (chronic kidney disease) stage 4, GFR 15-29 ml/min ([facility]) no uremic symptoms and no NSAID's, will make sure patient has regular follow up with nephrology. Edith Levi MD, Nephrology,  [address]   7. Acquired hypothyroidism borderline low in October and December. Has dry skin and sleeps more, but could be advanced age. Avoid over corrections with age. Since symptomatic will start with 12.5 mcg daily of synthroid. levothyroxine (SYNTHROID) 25 MCG tablet       SUBJECTIVE/OBJECTIVE:  Hypertension  This is a chronic problem. The current episode started more than 1 year ago. The problem has been gradually improving since onset. The problem is controlled. Pertinent negatives include no anxiety, chest pain, headaches, neck pain, orthopnea, palpitations, peripheral edema, PND, shortness of breath or sweats. There are no associated agents to hypertension. Risk factors for coronary artery disease include dyslipidemia and post-menopausal state. Past treatments include calcium channel blockers (amlodipine 5 mg qd.). The current treatment provides significant improvement. There are no compliance problems. There is no history of angina, kidney disease, CVA, heart failure, PVD or retinopathy. Review of Systems   Constitutional: Negative. Negative for activity change, appetite change, chills, diaphoresis, fatigue and fever. HENT: Negative for dental problem, ear pain, hearing loss, mouth sores, nosebleeds, postnasal drip, rhinorrhea, sore throat, trouble swallowing and voice change. Eyes: Negative for photophobia, pain, discharge, redness, itching and visual disturbance. Respiratory: Negative for apnea, cough, choking, chest tightness, shortness of breath, wheezing and stridor. Cardiovascular: Negative for chest pain, palpitations, orthopnea, leg swelling and PND. Hypertension hyperlipidemia. Remote history of coronary artery bypass surgery. Gastrointestinal: Negative for abdominal distention, abdominal pain, anal bleeding, blood in stool, constipation, diarrhea, nausea, rectal pain and vomiting.         History of foreign body abdominal wall high risk surgical patient so will need to take sounds. No murmur. No gallop. Pulmonary:      Effort: Pulmonary effort is normal. No respiratory distress. Breath sounds: Normal breath sounds. No wheezing or rales. Chest:      Chest wall: No tenderness. Abdominal:      General: Bowel sounds are normal. There is no distension. Palpations: Abdomen is soft. There is no mass. Tenderness: There is no abdominal tenderness. There is no guarding or rebound. Musculoskeletal: Normal range of motion. General: No tenderness. Lymphadenopathy:      Cervical: No cervical adenopathy. Comments: No adenopathy of cervical, supraclavicular, axillary or inguinal nodes. Skin:     General: Skin is warm and dry. Coloration: Skin is not pale. Findings: No erythema or rash. Neurological:      Mental Status: She is alert and oriented to person, place, and time. Cranial Nerves: No cranial nerve deficit. Motor: No abnormal muscle tone. Coordination: Coordination normal.      Deep Tendon Reflexes: Reflexes are normal and symmetric. Reflexes normal.   Psychiatric:         Mood and Affect: Mood normal.         Behavior: Behavior normal.         Thought Content: Thought content normal.         Judgment: Judgment normal.           On this date 01/11/21 I have spent 25 minutes reviewing previous notes, test results and face to face with the patient discussing the diagnosis and importance of compliance with the treatment plan as well as documenting on the day of the visit. An electronic signature was used to authenticate this note.     --Saira Redd MD

## 2021-01-11 ASSESSMENT — ENCOUNTER SYMPTOMS
SHORTNESS OF BREATH: 0
WHEEZING: 0
STRIDOR: 0
BLOOD IN STOOL: 0
ANAL BLEEDING: 0
ORTHOPNEA: 0
CONSTIPATION: 0
PHOTOPHOBIA: 0
EYE DISCHARGE: 0
EYE PAIN: 0
CHOKING: 0
TROUBLE SWALLOWING: 0
ABDOMINAL PAIN: 0
APNEA: 0
RHINORRHEA: 0
SORE THROAT: 0
VOMITING: 0
DIARRHEA: 0
EYE REDNESS: 0
COUGH: 0
RECTAL PAIN: 0
BACK PAIN: 0
NAUSEA: 0
EYE ITCHING: 0
ABDOMINAL DISTENTION: 0
CHEST TIGHTNESS: 0
VOICE CHANGE: 0
COLOR CHANGE: 0

## 2021-02-19 ENCOUNTER — TELEPHONE (OUTPATIENT)
Dept: PRIMARY CARE CLINIC | Age: 86
End: 2021-02-19

## 2021-02-19 NOTE — TELEPHONE ENCOUNTER
----- Message from Radha Rios sent at 2/18/2021 10:58 AM EST -----  Subject: Message to Provider    QUESTIONS  Information for Provider? pt canceled apt today due to the snow and she is   8 years old and didnt want to be out in this   please call pt to reschedule pt wants in office apt no in office   available.   ---------------------------------------------------------------------------  --------------  [address]  What is the best way for the office to contact you? OK to leave message on   voicemail  Preferred Call Back Phone Number? [phone]  ---------------------------------------------------------------------------  --------------  SCRIPT ANSWERS  Relationship to Patient?  Self

## 2021-03-03 DIAGNOSIS — I25.10 CORONARY ARTERY DISEASE INVOLVING NATIVE CORONARY ARTERY OF NATIVE HEART WITHOUT ANGINA PECTORIS: ICD-10-CM

## 2021-03-03 RX ORDER — CLOPIDOGREL BISULFATE 75 MG/1
75 TABLET ORAL DAILY
Qty: 90 TABLET | Refills: 1 | Status: SHIPPED | OUTPATIENT
Start: 2021-03-03 | End: 2021-09-09

## 2021-03-03 NOTE — TELEPHONE ENCOUNTER
[facility] patient has appointment with Dr Eric Lopez on 3/22/2021. Patient needs refill of clopidogrel ASAP.  Please advise,

## 2021-03-22 ENCOUNTER — OFFICE VISIT (OUTPATIENT)
Dept: PRIMARY CARE CLINIC | Age: 86
End: 2021-03-22
Payer: MEDICARE

## 2021-03-22 VITALS
HEIGHT: 60 IN | WEIGHT: 153 LBS | OXYGEN SATURATION: 98 % | TEMPERATURE: 97.4 F | BODY MASS INDEX: 30.04 KG/M2 | HEART RATE: 84 BPM | DIASTOLIC BLOOD PRESSURE: 74 MMHG | SYSTOLIC BLOOD PRESSURE: 137 MMHG

## 2021-03-22 DIAGNOSIS — E03.9 ACQUIRED HYPOTHYROIDISM: ICD-10-CM

## 2021-03-22 DIAGNOSIS — G31.84 MILD COGNITIVE IMPAIRMENT: ICD-10-CM

## 2021-03-22 DIAGNOSIS — H90.3 SENSORINEURAL HEARING LOSS (SNHL) OF BOTH EARS: ICD-10-CM

## 2021-03-22 DIAGNOSIS — Z51.81 THERAPEUTIC DRUG MONITORING: ICD-10-CM

## 2021-03-22 DIAGNOSIS — E78.2 MIXED HYPERLIPIDEMIA: ICD-10-CM

## 2021-03-22 DIAGNOSIS — N18.4 CKD (CHRONIC KIDNEY DISEASE) STAGE 4, GFR 15-29 ML/MIN (HCC): ICD-10-CM

## 2021-03-22 DIAGNOSIS — I48.0 PAROXYSMAL ATRIAL FIBRILLATION (HCC): ICD-10-CM

## 2021-03-22 DIAGNOSIS — E11.8 TYPE 2 DIABETES MELLITUS WITH COMPLICATION, WITHOUT LONG-TERM CURRENT USE OF INSULIN (HCC): ICD-10-CM

## 2021-03-22 DIAGNOSIS — E11.8 TYPE 2 DIABETES MELLITUS WITH COMPLICATION, WITHOUT LONG-TERM CURRENT USE OF INSULIN (HCC): Primary | ICD-10-CM

## 2021-03-22 PROCEDURE — 99215 OFFICE O/P EST HI 40 MIN: CPT | Performed by: INTERNAL MEDICINE

## 2021-03-22 RX ORDER — ACETAMINOPHEN 160 MG
TABLET,DISINTEGRATING ORAL
Qty: 30 CAPSULE | Refills: 5 | Status: SHIPPED | OUTPATIENT
Start: 2021-03-22 | End: 2021-10-08

## 2021-03-22 NOTE — PROGRESS NOTES
3/22/2021    Dameon Dowell (:  1921) is a 80 y.o. female, here for evaluation of the following medical concerns:    Chief Complaint   Patient presents with   Kami Juarez Doctor       HPI  80-year-old -American female with diet controlled type 2 diabetes A1c December 7.5%, treated hypertension and hyperlipidemia, chronic kidney disease stage IV creatinine 1.95, coronary artery disease status post MI and CABG on nitrates Lasix, paroxysmal atrial fibrillation on amiodarone 100mg q day without recent symptomatic recurrence, hypothyroidism on replacement , who comes in with her elderly son, to establish care following her providers residential. She has right anterior mediastinal catheter that may have been there for decades, they do not remember any procedure or any prior knowledge. Unfortunately, a abdominal wall foreign body on chronic doxycycline per ID. CT 2019 showed a drain in the right anterior mediastinum, present on CT 2017. She sees nephrology for her kidney disease and secondary renal hyperparathyroidism, seen last month, 1st time since . Over the past year creatinine has risen from 1.5-1.95. Hemoglobin 12.7 normal WBC. No changes made in her regimen. She is currently prescribed clopidogrel Imdur 60 omeprazole levothyroxine 12.5 micrograms daily, doxycycline 100 mg twice daily, amiodarone 100 mg daily amlodipine 7.5 mg daily atorvastatin 40 mg daily 2000 IU vitamin D daily and a probiotic. She is not on any diabetes medication currently. Labs 2020 showed good dyslipidemia control, but somewhat rising creatinine, adequate A1c allowing for anemia of renal disease    She was started on 12.5 mcg Synthroid 2021 for TSH 5.2 Free T4 1.2, along with increased somnolence and decreased engagement in previously enjoyable activities such as her TV shows. Her son wondered if she was depressed.   Also, though she carries no diagnosis of dementia, she is 8 years old. Head CT 2011 showed mild atrophy without evidence of stroke. Since starting the Synthroid, he has not noticed any improvement in her energy level or hobby engagement. No sundowning behavior, as best I can make out. No high risk behaviors. No recent falls or head trauma. Review of Systems   Constitutional: Negative for activity change, appetite change, fatigue and unexpected weight change. HENT: Negative for dental problem, sinus pain, sore throat and trouble swallowing. Eyes: Negative for pain and visual disturbance. Respiratory: Negative for apnea, cough, chest tightness, shortness of breath and wheezing. Cardiovascular: Negative for chest pain and palpitations. Gastrointestinal: Negative for abdominal pain, blood in stool, constipation, diarrhea, nausea, rectal pain and vomiting. Endocrine: Negative for cold intolerance, heat intolerance, polydipsia, polyphagia and polyuria. Genitourinary: Negative for difficulty urinating, dysuria, flank pain, frequency, hematuria, pelvic pain, urgency, vaginal bleeding and vaginal discharge. Musculoskeletal: Negative for arthralgias, back pain, gait problem, joint swelling, myalgias, neck pain and neck stiffness. Skin: Negative for color change and rash. Neurological: Negative for dizziness, tremors, syncope, speech difficulty, weakness, light-headedness and headaches. Hematological: Negative for adenopathy. Does not bruise/bleed easily. Psychiatric/Behavioral: Negative for agitation, behavioral problems, decreased concentration, sleep disturbance and suicidal ideas. The patient is not nervous/anxious and is not hyperactive. Prior to Visit Medications    Medication Sig Taking?  Authorizing Provider   Cholecalciferol (VITAMIN D3) 50 MCG (2000 UT) CAPS TAKE 1 CAPSULE BY MOUTH EVERY DAY Yes Juan Aggarwal MD   clopidogrel (PLAVIX) 75 MG tablet Take 1 tablet by mouth daily Yes Juan Aggarwal MD   isosorbide mononitrate (IMDUR) 60 MG extended release tablet Take 1 tablet by mouth daily Yes Keiry Salvador MD   omeprazole (PRILOSEC) 20 MG delayed release capsule Take 1 capsule by mouth daily as needed (heart burn or acid reflux symptoms) Yes Keiry Salvador MD   levothyroxine (SYNTHROID) 25 MCG tablet Take 0.5 tablets by mouth daily Yes Keiry Salvador MD   doxycycline hyclate (VIBRA-TABS) 100 MG tablet TAKE 1 TABLET BY MOUTH TWICE DAILY Yes Ariadne Rucker MD   PACERONE 100 MG tablet Take 100 mg by mouth daily Yes Charles Muro MD   amLODIPine (NORVASC) 5 MG tablet Take 1.5 tablets by mouth daily Yes Keiry Salvador MD   atorvastatin (LIPITOR) 40 MG tablet Take 1 tablet by mouth daily Yes Keiry Salvador MD   Lactobacillus (PROBIOTIC ACIDOPHILUS) TABS Take 1 tablet by mouth daily Yes Keiry Salvador MD        Allergies   Allergen Reactions    No Known Allergies        Past Medical History:   Diagnosis Date    Anemia     CKD (chronic kidney disease)     Diabetes ([facility] [address]     History of myocardial infarction     Hyperlipidemia     Hypertension     Secondary hyper[facility] ([email or web address]     Due to chronic kidney disease    Vitamin D deficiency        Past Surgical History:   Procedure Laterality Date    ANKLE FRACTURE SURGERY      CATARACT REMOVAL WITH IMPLANT Bilateral     Losartan in November 2015 by Dr. Kelly Lugo History     Socioeconomic History    Marital status:       Spouse name: Not on file    Number of children: Not on file    Years of education: Not on file    Highest education level: Not on file   Occupational History    Not on file   Social Needs    Financial resource strain: Not on file    Food insecurity     Worry: Not on file     Inability: Not on file    Transportation needs     Medical: Not on file     Non-medical: Not on file   Tobacco Use    Smoking status: Never Smoker    Smokeless tobacco: Never Used   Substance and Sexual Activity    Alcohol use: Not Currently    Drug use: Never    Sexual activity: Not Currently     Partners: Male   Lifestyle    Physical activity     Days per week: 0 days     Minutes per session: Not on file    Stress: Not at all   Relationships    Social connections     Talks on phone: Not on file     Gets together: Not on file     Attends Gnosticism service: Not on file     Active member of club or organization: Not on file     Attends meetings of clubs or organizations: Not on file     Relationship status: Not on file    Intimate partner violence     Fear of current or ex partner: Not on file     Emotionally abused: Not on file     Physically abused: Not on file     Forced sexual activity: Not on file   Other Topics Concern    Not on file   Social History Narrative    Not on file        No family history on file. Vitals:    03/22/21 1133   BP: 137/74   Pulse: 84   Temp: 97.4 °F (36.3 °C)   TempSrc: Oral   SpO2: 98%   Weight: 153 lb (69.4 kg)   Height: 5' (1.524 m)     Estimated body mass index is 29.88 kg/m² as calculated from the following:    Height as of this encounter: 5' (1.524 m). Weight as of this encounter: 153 lb (69.4 kg). PHYSICAL EXAM  GENERAL:  Pleasant  female who looks her stated age, awake alert and oriented x2, no acute distress. HEENT:  Normocephalic atraumatic. Pupils equal round reactive light and accommodation, extra ocular muscles are intact. Oropharynx is clear moist without injection or exudate. Tongue and palate move normally. Turbinates appear normal.  Tympanic membranes appear normal.  NECK:  Supple nontender. No carotid bruits. Brisk carotid upstrokes, no JVD. No thyromegaly. LYMPH:  No supraclavicular cervical axillary or inguinal lymphadenopathy. LUNGS:  Clear to auscultation bilaterally. Very Good air entry. No inspiratory crackles or expiratory wheezes.   HEART:  Regular rate and rhythm without pathologic murmur rub gallop S3 or S4. ABDOMEN:  Soft, nontender. Normal bowel sounds. No guarding. No masses. UROGENITAL:  Deferred  EXTREMITIES:  Warm and well perfused without clubbing cyanosis 1-2+ ankle edema bilaterally. 2+ pulses in all 4 extremities. Capillary refill less than 2 seconds. NEURO: She reports the president is named Scarlett, it is 2020, and that a michael nickel and dime total 16 cents (last is correct). Patient is impaired, I had to repeat michael nickel dime 4-5 times before she could remember it long enough to do the math. Cranial nerves 2-12 grossly intact except 8, hard of hearing bilaterally. Normal muscle bulk and tone. No resting tremor, cogwheeling, normal rapid alternating movements in the hands and feet. No stocking paresthesia. Did not assess gait and station. MUSCULOSKELETAL: Moderate osteoarthritic changes. No active synovitis. SKIN:  No worrisome lesions, skin a little dry. PSYCH:  No psychomotor retardation or agitation. Good eye contact. Unrestricted affect range. Mood congruent with affect. Linear thought.     LABS  Lab Review   Office Visit on 11/25/2020   Component Date Value    Segs Relative 12/11/2020 75.4     Lymphocytes % 12/11/2020 17.2     Monocytes % 12/11/2020 6.6     Eosinophils % 12/11/2020 0.4     Basophils % 12/11/2020 0.4     nRBC 12/11/2020 0     Neutrophils Absolute 12/11/2020 7,917*    Lymphocytes Absolute 12/11/2020 1,806     Monocytes Absolute 12/11/2020 693     Eosinophils Absolute 12/11/2020 42     Basophils Absolute 12/11/2020 42     WBC 12/11/2020 10.5     RBC 12/11/2020 4.14     Hemoglobin 12/11/2020 12.7     Hematocrit 12/11/2020 39.3     MCV 12/11/2020 94.9     MCH 12/11/2020 30.6     MCHC 12/11/2020 32.3     RDW 12/11/2020 15.5*    Platelets 10/87/3298 219     MPV 12/11/2020 7.9     Sodium 12/11/2020 144     Potassium 12/11/2020 3.8     Chloride 12/11/2020 104     CO2 12/11/2020 26     Anion Gap 12/11/2020 14     BUN 12/11/2020 35*    CREATININE 12/11/2020 1.95*    Glucose 12/11/2020 229*    Calcium 12/11/2020 10.0     Total Bilirubin 12/11/2020 0.8     AST 12/11/2020 18     ALT 12/11/2020 16     Alkaline Phosphatase 12/11/2020 126*    Total Protein 12/11/2020 6.7     Albumin 12/11/2020 4.0     Osmolality Calc 12/11/2020 313*    GFR, Estimated 12/11/2020 24     GFR, Estimated 12/11/2020 21     Total CK 12/11/2020 34     TSH, 3RD GENERATION 12/11/2020 5.23*    T4 Free 12/11/2020 1.17     Hemoglobin A1C 12/11/2020 7.5*    Creatinine, Ur 12/11/2020 229.00     Microalbumin, Random Uri* 12/11/2020 64.6*    Microalbumin Creatinine * 12/11/2020 28.2     Amiodarone, Serum 12/11/2020 1.1     N-Desethyl-Amiodarone 12/11/2020 0.6*   Office Visit on 10/22/2020   Component Date Value    Sodium 11/04/2020 143     Potassium 11/04/2020 4.3     Chloride 11/04/2020 105     CO2 11/04/2020 27     Anion Gap 11/04/2020 11     BUN 11/04/2020 42*    CREATININE 11/04/2020 1.78*    Glucose 11/04/2020 206*    Calcium 11/04/2020 10.1     Phosphorus 11/04/2020 3.1     Albumin 11/04/2020 4.0     Osmolality Calc 11/04/2020 312*    GFR, Estimated 11/04/2020 27     GFR, Estimated 11/04/2020 23     Cholesterol, Total 11/04/2020 186     Triglycerides 11/04/2020 119     HDL 11/04/2020 79     LDL Calculated 11/04/2020 83     Total CK 11/04/2020 37     TSH, 3RD GENERATION 11/04/2020 5.62*    T4 Free 11/04/2020 1.13    Office Visit on 07/24/2020   Component Date Value    TSH, 3RD GENERATION 09/09/2020 4.79*    Hemoglobin A1C 09/09/2020 7.1*    Cholesterol, Total 09/09/2020 226*    Triglycerides 09/09/2020 126     HDL 09/09/2020 81     LDL Calculated 09/09/2020 120     Sodium 09/09/2020 141     Potassium 09/09/2020 4.1     Chloride 09/09/2020 105     CO2 09/09/2020 22     Anion Gap 09/09/2020 14     BUN 09/09/2020 37*    CREATININE 09/09/2020 1.82*    Glucose 09/09/2020 141*    Calcium 09/09/2020 9.7     Phosphorus 09/09/2020 3.2     Albumin 09/09/2020 3.7     Osmolality Calc 09/09/2020 303     GFR, Estimated 09/09/2020 26     GFR, Estimated 09/09/2020 23     Color, UA 09/10/2020 Yellow     Clarity, UA 09/10/2020 Clear     Specific Gravity, UA 09/10/2020 1.016     PH, Art 09/10/2020 6.0     Protein, UA 09/10/2020 Negative     Glucose, Ur 09/10/2020 Negative     Ketones, Urine 09/10/2020 Negative     Bilirubin Urine 09/10/2020 Negative     Erythrocytes, Urine 09/10/2020 Negative     Nitrite, Urine 09/10/2020 Negative     Urobilinogen, Urine 09/10/2020 <2.0     Leukocyte Esterase, Urine 09/10/2020 Negative     Creatinine, Ur 09/10/2020 99.10     Microalbumin, Random Uri* 09/10/2020 7.4     Microalbumin Creatinine * 09/10/2020 7.5     Amiodarone, Serum 09/09/2020 1.1     N-Desethyl-Amiodarone 09/09/2020 0.8*   Orders Only on 03/24/2020   Component Date Value    Amiodarone Lvl 03/24/2020 0.6     CARLTON-AMIOD 03/24/2020 0.6     TSH Reflex FT4 03/24/2020 4.85*    Hemoglobin A1C 03/24/2020 6.5     eAG 03/24/2020 139.9     Sodium 03/24/2020 134*    Potassium 03/24/2020 4.2     Chloride 03/24/2020 98*    CO2 03/24/2020 23     Anion Gap 03/24/2020 13     Glucose 03/24/2020 145*    BUN 03/24/2020 34*    CREATININE 03/24/2020 1.5*    GFR Non- 03/24/2020 32*    GFR  03/24/2020 39*    Calcium 03/24/2020 9.9     Phosphorus 03/24/2020 2.9     Albumin 03/24/2020 4.2     Vitamin B-12 03/24/2020 848     Vit D, 25-Hydroxy 03/24/2020 48.1     T4 Free 03/24/2020 1.3    Orders Only on 03/12/2020   Component Date Value    Visual Acuity Distance R* 03/12/2020 20/40     Visual Acuity Distance L* 03/12/2020 20/40     Intraocular Pressure Eye 03/12/2020                      Value:17  17      Diabetic Retinopathy 03/12/2020 NEGATIVE     Cataracts 03/12/2020 NEGATIVE     Glaucoma 03/12/2020 NEGATIVE          ASSESSMENT/PLAN  1.  Type 2 diabetes mellitus with complication, without long-term current use of insulin (HCC)  Given renal disease, will check fructosamine and A1c to better assess level of glycemic control by diet alone. On statin and Plavix at age [address]ortencia Stacie [age],  - Hemoglobin A1C; Future  - FRUCTOSAMINE; Future    2. Acquired hypothyroidism    - TSH with Reflex; Future  - TSH with Reflex; Future    3. Mixed hyperlipidemia  Update 8 months approximately. 4. CKD (chronic kidney disease) stage 4, GFR 15-29 ml/min ([facility] [address]  Sees nephrology in 6 months. - Basic Metabolic Panel; Future    5. Paroxysmal atrial fibrillation (HCC)  Chronic amiodarone 100 mg. Lung exam is pretty reassuring, updating TSH to ensure adequacy of replacement. Has not seen an eye doctor  - TSH with Reflex; Future    6. Therapeutic drug monitoring  Chronic doxycycline therapy demands, monitor ALT CBC creatinine twice a year. - ALT; Future  - CBC Auto Differential; Future    7. Fatigue. Multifactorial.  Mild cognitive impairment, chronic renal disease, extreme age, adjustment disorder related to inability to do previously enjoyable more strenuous activities, increased social isolation due to winter and Covid. Thyroid Supplementation does not appear to have improved the situation. Pain with her son that depression is a consideration, we agreed to reassess at follow-up, as social isolation due to seasonal change and Covid vaccination lessons. She is not interested in hearing aids, which does of course increase her social isolation. Perhaps a trial of \"pocket talker\" would help. 8. Healthcare maintenance. Up-to-date with Pneumovax 23. No Tdap in years. Received Covid vaccinations. Return in about 4 months (around 2021) for after labs. It was a pleasure to visit with Ms. Silvio Stephens today. Answered all questions as best I could.   Gerald Bonilla MD   Time 45 minutes

## 2021-03-23 ENCOUNTER — TELEPHONE (OUTPATIENT)
Dept: PRIMARY CARE CLINIC | Age: 86
End: 2021-03-23

## 2021-03-23 LAB
ESTIMATED AVERAGE GLUCOSE: 145.6 MG/DL
HBA1C MFR BLD: 6.7 %
TSH REFLEX: 3.62 UIU/ML (ref 0.27–4.2)

## 2021-03-23 NOTE — TELEPHONE ENCOUNTER
----- Message from Laurent Gonzalez MD sent at 3/23/2021 10:50 AM EDT -----  Please relay this suggestion to the patient's son. He expressed some frustration about her lack of interest in hearing aids. There are devices on the market that have a microphone connected to an amplifier and light headphones that she might find handy. It's called a \"pocket talker. \"    He can look them up on [employer], or buy them at [employer]. Costs around $150, would ask if could try it out and return it if she doesn't like them.

## 2021-03-23 NOTE — TELEPHONE ENCOUNTER
Spoke with patient son informed him [employer] or [employer] has different kind of hearing aids. He said the cost is no problem. He states he know she will not use them.

## 2021-03-29 ENCOUNTER — TELEPHONE (OUTPATIENT)
Dept: PRIMARY CARE CLINIC | Age: 86
End: 2021-03-29

## 2021-03-29 NOTE — TELEPHONE ENCOUNTER
MD Lisseth decker MA             Please let patient and patient's son know that her body is very happy with the very small dose of thyroid hormone that she is receiving     Patient son informed.

## 2021-04-20 ENCOUNTER — TELEPHONE (OUTPATIENT)
Dept: PRIMARY CARE CLINIC | Age: 86
End: 2021-04-20

## 2021-04-21 DIAGNOSIS — E03.9 ACQUIRED HYPOTHYROIDISM: ICD-10-CM

## 2021-04-21 DIAGNOSIS — E78.2 MIXED HYPERLIPIDEMIA: ICD-10-CM

## 2021-04-21 RX ORDER — LEVOTHYROXINE SODIUM 0.03 MG/1
12.5 TABLET ORAL DAILY
Qty: 30 TABLET | Refills: 1 | Status: SHIPPED | OUTPATIENT
Start: 2021-04-21 | End: 2021-09-03

## 2021-04-21 RX ORDER — AMIODARONE HYDROCHLORIDE 100 MG/1
100 TABLET ORAL DAILY
Qty: 30 TABLET | Refills: 0 | Status: SHIPPED | OUTPATIENT
Start: 2021-04-21 | End: 2021-05-24

## 2021-04-21 RX ORDER — ATORVASTATIN CALCIUM 40 MG/1
40 TABLET, FILM COATED ORAL DAILY
Qty: 90 TABLET | Refills: 1 | Status: SHIPPED | OUTPATIENT
Start: 2021-04-21 | End: 2021-10-01

## 2021-07-12 DIAGNOSIS — K21.00 GASTROESOPHAGEAL REFLUX DISEASE WITH ESOPHAGITIS WITHOUT HEMORRHAGE: ICD-10-CM

## 2021-07-12 DIAGNOSIS — I10 ESSENTIAL HYPERTENSION: ICD-10-CM

## 2021-07-12 DIAGNOSIS — I25.10 CORONARY ARTERY DISEASE INVOLVING NATIVE CORONARY ARTERY OF NATIVE HEART WITHOUT ANGINA PECTORIS: ICD-10-CM

## 2021-07-12 RX ORDER — ISOSORBIDE MONONITRATE 60 MG/1
60 TABLET, EXTENDED RELEASE ORAL DAILY
Qty: 30 TABLET | Refills: 5 | Status: SHIPPED | OUTPATIENT
Start: 2021-07-12 | End: 2022-01-03

## 2021-07-12 RX ORDER — OMEPRAZOLE 20 MG/1
CAPSULE, DELAYED RELEASE ORAL
Qty: 30 CAPSULE | Refills: 5 | Status: SHIPPED | OUTPATIENT
Start: 2021-07-12 | End: 2021-07-14

## 2021-07-12 NOTE — TELEPHONE ENCOUNTER
Medication:   Requested Prescriptions     Pending Prescriptions Disp Refills    isosorbide mononitrate (IMDUR) 60 MG extended release tablet [Pharmacy Med Name: ISOSORBIDE MONONITRATE 60MG ER TABS] 30 tablet 5     Sig: TAKE 1 TABLET BY MOUTH DAILY    omeprazole (PRILOSEC) 20 MG delayed release capsule [Pharmacy Med Name: OMEPRAZOLE 20MG CAPSULES] 30 capsule 5     Sig: TAKE 1 CAPSULE BY MOUTH DAILY AS NEEDED FOR HEART BURN OR ACID REFLUX SYMPTOMS        Last Filled:      Patient Phone Number: [phone] (home)     Last appt: 3/22/2021   Next appt: Visit date not found    Last OARRS: No flowsheet data found.

## 2021-07-12 NOTE — TELEPHONE ENCOUNTER
Medication:   Requested Prescriptions     Pending Prescriptions Disp Refills    isosorbide mononitrate (IMDUR) 60 MG extended release tablet [Pharmacy Med Name: ISOSORBIDE MONONITRATE 60MG ER TABS] 30 tablet 5     Sig: TAKE 1 TABLET BY MOUTH DAILY    omeprazole (PRILOSEC) 20 MG delayed release capsule [Pharmacy Med Name: OMEPRAZOLE 20MG CAPSULES] 30 capsule 5     Sig: TAKE 1 CAPSULE BY MOUTH DAILY AS NEEDED FOR HEART BURN OR ACID REFLUX SYMPTOMS        Last Filled:      Patient Phone Number: [phone] (home)     Last appt: 3/22/2021   Next appt: Visit date not found    Last OARRS: No flowsheet data found.

## 2021-07-14 RX ORDER — OMEPRAZOLE 20 MG/1
CAPSULE, DELAYED RELEASE ORAL
Qty: 90 CAPSULE | Refills: 1 | Status: SHIPPED | OUTPATIENT
Start: 2021-07-14 | End: 2022-10-18

## 2021-08-31 DIAGNOSIS — E03.9 ACQUIRED HYPOTHYROIDISM: ICD-10-CM

## 2021-09-03 DIAGNOSIS — E03.9 ACQUIRED HYPOTHYROIDISM: ICD-10-CM

## 2021-09-03 RX ORDER — LEVOTHYROXINE SODIUM 0.03 MG/1
12.5 TABLET ORAL DAILY
Qty: 30 TABLET | Refills: 0 | Status: SHIPPED | OUTPATIENT
Start: 2021-09-03 | End: 2021-11-11

## 2021-09-03 NOTE — TELEPHONE ENCOUNTER
Medication:   Requested Prescriptions     Pending Prescriptions Disp Refills    levothyroxine (SYNTHROID) 25 MCG tablet [Pharmacy Med Name: LEVOTHYROXINE 0.025MG (25MCG) TAB] 30 tablet 0     Sig: Take 0.5 tablets by mouth Daily Make an appointment before next refill        Last Filled:      Patient Phone Number: [phone] (home)     Last appt: 3/22/2021   Next appt: Visit date not found    Last OARRS: No flowsheet data found.

## 2021-09-07 RX ORDER — LEVOTHYROXINE SODIUM 0.03 MG/1
TABLET ORAL
Qty: 45 TABLET | OUTPATIENT
Start: 2021-09-07

## 2021-09-08 DIAGNOSIS — I25.10 CORONARY ARTERY DISEASE INVOLVING NATIVE CORONARY ARTERY OF NATIVE HEART WITHOUT ANGINA PECTORIS: ICD-10-CM

## 2021-09-09 RX ORDER — AMIODARONE HYDROCHLORIDE 200 MG/1
TABLET ORAL
Qty: 90 TABLET | Refills: 1 | Status: SHIPPED | OUTPATIENT
Start: 2021-09-09 | End: 2022-04-08 | Stop reason: SDUPTHER

## 2021-09-09 RX ORDER — CLOPIDOGREL BISULFATE 75 MG/1
75 TABLET ORAL DAILY
Qty: 90 TABLET | Refills: 1 | Status: SHIPPED | OUTPATIENT
Start: 2021-09-09 | End: 2022-01-03

## 2021-09-20 ENCOUNTER — OFFICE VISIT (OUTPATIENT)
Dept: PRIMARY CARE CLINIC | Age: 86
End: 2021-09-20
Payer: MEDICARE

## 2021-09-20 VITALS — SYSTOLIC BLOOD PRESSURE: 132 MMHG | OXYGEN SATURATION: 98 % | HEART RATE: 76 BPM | DIASTOLIC BLOOD PRESSURE: 70 MMHG

## 2021-09-20 DIAGNOSIS — E03.9 ACQUIRED HYPOTHYROIDISM: ICD-10-CM

## 2021-09-20 DIAGNOSIS — N18.4 CKD (CHRONIC KIDNEY DISEASE) STAGE 4, GFR 15-29 ML/MIN (HCC): ICD-10-CM

## 2021-09-20 DIAGNOSIS — E11.8 TYPE 2 DIABETES MELLITUS WITH COMPLICATION, WITHOUT LONG-TERM CURRENT USE OF INSULIN (HCC): Primary | ICD-10-CM

## 2021-09-20 DIAGNOSIS — E78.2 MIXED HYPERLIPIDEMIA: ICD-10-CM

## 2021-09-20 DIAGNOSIS — N18.32 STAGE 3B CHRONIC KIDNEY DISEASE (HCC): ICD-10-CM

## 2021-09-20 DIAGNOSIS — I10 ESSENTIAL HYPERTENSION: ICD-10-CM

## 2021-09-20 DIAGNOSIS — Z23 FLU VACCINE NEED: ICD-10-CM

## 2021-09-20 PROCEDURE — 90694 VACC AIIV4 NO PRSRV 0.5ML IM: CPT | Performed by: INTERNAL MEDICINE

## 2021-09-20 PROCEDURE — G0008 ADMIN INFLUENZA VIRUS VAC: HCPCS | Performed by: INTERNAL MEDICINE

## 2021-09-20 PROCEDURE — 99213 OFFICE O/P EST LOW 20 MIN: CPT | Performed by: INTERNAL MEDICINE

## 2021-09-20 SDOH — ECONOMIC STABILITY: FOOD INSECURITY: WITHIN THE PAST 12 MONTHS, THE FOOD YOU BOUGHT JUST DIDN'T LAST AND YOU DIDN'T HAVE MONEY TO GET MORE.: NEVER TRUE

## 2021-09-20 SDOH — ECONOMIC STABILITY: FOOD INSECURITY: WITHIN THE PAST 12 MONTHS, YOU WORRIED THAT YOUR FOOD WOULD RUN OUT BEFORE YOU GOT MONEY TO BUY MORE.: NEVER TRUE

## 2021-09-20 ASSESSMENT — SOCIAL DETERMINANTS OF HEALTH (SDOH): HOW HARD IS IT FOR YOU TO PAY FOR THE VERY BASICS LIKE FOOD, HOUSING, MEDICAL CARE, AND HEATING?: NOT HARD AT ALL

## 2021-09-20 NOTE — PROGRESS NOTES
2021    Jo Villalba (:  1921) is a [place]acie [age] y.o. female, here for evaluation of the following medical concerns:    Chief Complaint   Patient presents with    Diabetes    Hypertension       HPI  80-year-old -American female with diet controlled type 2 diabetes A1c December 7.5%, treated hypertension and hyperlipidemia, chronic kidney disease stage IV creatinine 2.1, coronary artery disease status post MI and CABG on nitrates Lasix, paroxysmal atrial fibrillation on amiodarone 100mg q day without recent symptomatic recurrence, hypothyroidism on replacement , who established care in 2021 comes in for 6-month follow-up. She has right anterior mediastinal catheter that may have been there for decades, they do not remember any procedure or any prior knowledge and a abdominal wall foreign body on chronic doxycycline per ID. CT 2019 showed a drain in the right anterior mediastinum, present on CT 2017. She sees nephrology for her kidney disease and secondary renal hyperparathyroidism, seen last month, creatinine stable at 2, bicarb 21-22 without hyponatremia or hyperkalemia. Hemoglobin 11.7 normal WBC. No changes made in her regimen. She is currently prescribed clopidogrel Imdur 60 omeprazole levothyroxine 12.5 micrograms daily, doxycycline 100 mg twice daily, amiodarone 100 mg daily amlodipine 7.5 mg daily atorvastatin 40 mg daily 2000 IU vitamin D daily and a probiotic. She is not on any diabetes medication currently. She was started on 12.5 mcg Synthroid 2021 for TSH 5.2 Free T4 1.2, along with increased somnolence and decreased engagement in previously enjoyable activities such as her TV shows. Her son wondered if she was depressed. Also, though she carries no diagnosis of dementia, she is 8 years old. Head CT  showed mild atrophy without evidence of stroke.   Since starting the Synthroid, he has not noticed any improvement in her energy level or hobby engagement. No sundowning behavior, as best I can make out. No high risk behaviors. No recent falls or head trauma. Review of Systems   Constitutional: Negative for activity change, appetite change, fatigue and unexpected weight change. HENT: Negative for dental problem, sinus pain, sore throat and trouble swallowing. Eyes: Negative for pain and visual disturbance. Respiratory: Negative for apnea, cough, chest tightness, shortness of breath and wheezing. Cardiovascular: Negative for chest pain and palpitations. Gastrointestinal: Negative for abdominal pain, blood in stool, constipation, diarrhea, nausea, rectal pain and vomiting. Endocrine: Negative for cold intolerance, heat intolerance, polydipsia, polyphagia and polyuria. Genitourinary: Negative for difficulty urinating, dysuria, flank pain, frequency, hematuria, pelvic pain, urgency, vaginal bleeding and vaginal discharge. Musculoskeletal: Negative for arthralgias, back pain, gait problem, joint swelling, myalgias, neck pain and neck stiffness. Skin: Negative for color change and rash. Neurological: Negative for dizziness, tremors, syncope, speech difficulty, weakness, light-headedness and headaches. Hematological: Negative for adenopathy. Does not bruise/bleed easily. Psychiatric/Behavioral: Negative for agitation, behavioral problems, decreased concentration, sleep disturbance and suicidal ideas. The patient is not nervous/anxious and is not hyperactive. Prior to Visit Medications    Medication Sig Taking?  Authorizing Provider   amiodarone (CORDARONE) 200 MG tablet TAKE 1 TABLET BY MOUTH DAILY Yes Dexter Maciel MD   clopidogrel (PLAVIX) 75 MG tablet TAKE 1 TABLET BY MOUTH DAILY Yes Dexter Maciel MD   levothyroxine (SYNTHROID) 25 MCG tablet Take 0.5 tablets by mouth Daily Make an appointment before next refill Yes Dexter Maciel MD   isosorbide mononitrate (IMDUR) 60 MG extended release tablet TAKE 1 TABLET BY MOUTH DAILY Yes Clementine Smalls MD   atorvastatin (LIPITOR) 40 MG tablet Take 1 tablet by mouth daily Yes Clementine Smalls MD   Cholecalciferol (VITAMIN D3) 50 MCG (2000 UT) CAPS TAKE 1 CAPSULE BY MOUTH EVERY DAY Yes Clementine Smalls MD   doxycycline hyclate (VIBRA-TABS) 100 MG tablet TAKE 1 TABLET BY MOUTH TWICE DAILY Yes Nelson Lord MD   amLODIPine (NORVASC) 5 MG tablet Take 1.5 tablets by mouth daily Yes Zaynab Herrera MD   Lactobacillus (PROBIOTIC ACIDOPHILUS) TABS Take 1 tablet by mouth daily Yes Zaynab Herrera MD   omeprazole ([address]) 20 MG delayed release capsule TAKE 1 CAPSULE BY MOUTH EVERY DAY AS NEEDED FOR HEART BURN OR ACID REFLUX  Patient not taking: Reported on 9/20/2021  Clementine Smalls MD        Allergies   Allergen Reactions    No Known Allergies        Past Medical History:   Diagnosis Date    Anemia     CKD (chronic kidney disease)     Diabetes ([facility] [address]     History of myocardial infarction     Hyperlipidemia     Hypertension     [facility]     Due to chronic kidney disease    Vitamin D deficiency        Past Surgical History:   Procedure Laterality Date    ANKLE FRACTURE SURGERY      CATARACT REMOVAL WITH IMPLANT Bilateral     Losartan in November 2015 by Dr. Mike Escalante History     Socioeconomic History    Marital status:       Spouse name: Not on file    Number of children: Not on file    Years of education: Not on file    Highest education level: Not on file   Occupational History    Not on file   Tobacco Use    Smoking status: Never Smoker    Smokeless tobacco: Never Used   Vaping Use    Vaping Use: Never used   Substance and Sexual Activity    Alcohol use: Not Currently    Drug use: Never    Sexual activity: Not Currently     Partners: Male   Other Topics Concern    Not on file   Social History Narrative    Not on file     Social Determinants of Health     Financial Resource Strain: sounds. No guarding. No masses. UROGENITAL:  Deferred  EXTREMITIES:  Warm and well perfused without clubbing cyanosis 1-2+ ankle edema bilaterally. 2+ pulses in all 4 extremities. Capillary refill less than 2 seconds. NEURO: Mild cognitive impairment, hard of hearing bilaterally. Normal muscle bulk and tone. No resting tremor, cogwheeling, normal rapid alternating movements in the hands and feet. No stocking paresthesia. Did not assess gait and station. MUSCULOSKELETAL: Moderate osteoarthritic changes. No active synovitis. SKIN:  No worrisome lesions, skin a little dry. PSYCH:  No psychomotor retardation or agitation. Good eye contact. Unrestricted affect range. Mood congruent with affect. Linear thought.     LABS  Lab Review   Orders Only on 03/22/2021   Component Date Value    TSH 03/22/2021 3.62     Hemoglobin A1C 03/22/2021 6.7     eAG 03/22/2021 145.6    Office Visit on 11/25/2020   Component Date Value    Segs Relative 12/11/2020 75.4     Lymphocytes % 12/11/2020 17.2     Monocytes % 12/11/2020 6.6     Eosinophils % 12/11/2020 0.4     Basophils % 12/11/2020 0.4     nRBC 12/11/2020 0     Neutrophils Absolute 12/11/2020 7,917*    Lymphocytes Absolute 12/11/2020 1,806     Monocytes Absolute 12/11/2020 693     Eosinophils Absolute 12/11/2020 42     Basophils Absolute 12/11/2020 42     WBC 12/11/2020 10.5     RBC 12/11/2020 4.14     Hemoglobin 12/11/2020 12.7     Hematocrit 12/11/2020 39.3     MCV 12/11/2020 94.9     MCH 12/11/2020 30.6     MCHC 12/11/2020 32.3     RDW 12/11/2020 15.5*    Platelets 06/13/9837 219     MPV 12/11/2020 7.9     Sodium 12/11/2020 144     Potassium 12/11/2020 3.8     Chloride 12/11/2020 104     CO2 12/11/2020 26     Anion Gap 12/11/2020 14     BUN 12/11/2020 35*    CREATININE 12/11/2020 1.95*    Glucose 12/11/2020 229*    Calcium 12/11/2020 10.0     Total Bilirubin 12/11/2020 0.8     AST 12/11/2020 18     ALT 12/11/2020 16     Alkaline Phosphatase 12/11/2020 126*    Total Protein 12/11/2020 6.7     Albumin 12/11/2020 4.0     Osmolality Calc 12/11/2020 313*    GFR, Estimated 12/11/2020 24     GFR, Estimated 12/11/2020 21     Total CK 12/11/2020 34     TSH, 3RD GENERATION 12/11/2020 5.23*    T4 Free 12/11/2020 1.17     Hemoglobin A1C 12/11/2020 7.5*    Creatinine, Ur 12/11/2020 229.00     Microalbumin, Random Uri* 12/11/2020 64.6*    Microalbumin Creatinine * 12/11/2020 28.2     Amiodarone, Serum 12/11/2020 1.1     N-Desethyl-Amiodarone 12/11/2020 0.6*   Office Visit on 10/22/2020   Component Date Value    Sodium 11/04/2020 143     Potassium 11/04/2020 4.3     Chloride 11/04/2020 105     CO2 11/04/2020 27     Anion Gap 11/04/2020 11     BUN 11/04/2020 42*    CREATININE 11/04/2020 1.78*    Glucose 11/04/2020 206*    Calcium 11/04/2020 10.1     Phosphorus 11/04/2020 3.1     Albumin 11/04/2020 4.0     Osmolality Calc 11/04/2020 312*    GFR, Estimated 11/04/2020 27     GFR, Estimated 11/04/2020 23     Cholesterol, Total 11/04/2020 186     Triglycerides 11/04/2020 119     HDL 11/04/2020 79     LDL Calculated 11/04/2020 83     Total CK 11/04/2020 37     TSH, 3RD GENERATION 11/04/2020 5.62*    T4 Free 11/04/2020 1.13    Office Visit on 07/24/2020   Component Date Value    TSH, 3RD GENERATION 09/09/2020 4.79*    Hemoglobin A1C 09/09/2020 7.1*    Cholesterol, Total 09/09/2020 226*    Triglycerides 09/09/2020 126     HDL 09/09/2020 81     LDL Calculated 09/09/2020 120     Sodium 09/09/2020 141     Potassium 09/09/2020 4.1     Chloride 09/09/2020 105     CO2 09/09/2020 22     Anion Gap 09/09/2020 14     BUN 09/09/2020 37*    CREATININE 09/09/2020 1.82*    Glucose 09/09/2020 141*    Calcium 09/09/2020 9.7     Phosphorus 09/09/2020 3.2     Albumin 09/09/2020 3.7     Osmolality Calc 09/09/2020 303     GFR, Estimated 09/09/2020 26     GFR, Estimated 09/09/2020 23     Color, UA 09/10/2020 Yellow     Clarity, UA 09/10/2020 Clear     Specific Gravity, UA 09/10/2020 1.016     PH, Art 09/10/2020 6.0     Protein, UA 09/10/2020 Negative     Glucose, Ur 09/10/2020 Negative     Ketones, Urine 09/10/2020 Negative     Bilirubin Urine 09/10/2020 Negative     Erythrocytes, Urine 09/10/2020 Negative     Nitrite, Urine 09/10/2020 Negative     Urobilinogen, Urine 09/10/2020 <2.0     Leukocyte Esterase, Urine 09/10/2020 Negative     Creatinine, Ur 09/10/2020 99.10     Microalbumin, Random Uri* 09/10/2020 7.4     Microalbumin Creatinine * 09/10/2020 7.5     Amiodarone, Serum 2020 1.1     N-Desethyl-Amiodarone 2020 0.8*         ASSESSMENT/PLAN  1. Type 2 diabetes mellitus with complication, without long-term current use of insulin (HCC)  Given renal disease, will check fructosamine and A1c to better assess level of glycemic control by diet alone. On statin and Plavix at age Hortencia Stacie [age], no recent falls, no significant anemia.  - Hemoglobin A1C; Future  - FRUCTOSAMINE; Future    2. Acquired hypothyroidism  Update fT4 today    3. Mixed hyperlipidemia  NormaL LFTs last month    4. CKD (chronic kidney disease) stage 4, GFR 15-29 ml/min ([facility] [address]  Sees nephrology in 6 months. Cr stable 2.1, bicarb 21-22, normal Na and K    5. Paroxysmal atrial fibrillation (HCC)  Chronic amiodarone 100 mg. Lung exam is pretty reassuring, updating TSH to ensure adequacy of replacement. Has not seen an eye doctor    6. Therapeutic drug monitoring  Chronic doxycycline therapy demands, monitor ALT CBC creatinine twice a year. 7. Fatigue. Multifactorial.  Mild cognitive impairment, chronic renal disease, extreme age, adjustment disorder related to inability to do previously enjoyable more strenuous activities, increased social isolation due to winter and Covid. Thyroid Supplementation does not appear to have improved the situation.     Pain with her son that depression is a consideration, we agreed to reassess at follow-up, as social isolation due to seasonal change and Covid vaccination lessons. She is not interested in hearing aids, which does of course increase her social isolation. Perhaps a trial of \"pocket talker\" would help. 8. Healthcare maintenance. Up-to-date with Pneumovax 23. No Tdap in years. Received Covid vaccinations. Will boost w Pfizer and provide flu shot today. Return in about 6 months (around 3/20/2022). It was a pleasure to visit with Ms. Juan Husain today. Answered all questions as best I could.   Willard Husain MD   Time 25 minutes

## 2021-09-20 NOTE — PROGRESS NOTES
Vaccine Information Sheet, \"Influenza - Inactivated\"  given to Jo Villalba, or parent/legal guardian of  Jo Villalba and verbalized understanding. Patient responses:    Have you ever had a reaction to a flu vaccine? nNo  Do you have any current illness? No  Have you ever had Guillian [place] Syndrome? No  Do you have a serious allergy to any of the follow: Neomycin, Polymyxin, Thimerosal, eggs or egg products? No    Flu vaccine given per order. Please see immunization tab. Risks and benefits explained. Current VIS given.

## 2021-09-20 NOTE — PATIENT INSTRUCTIONS
1. Flu shot today  2. Pfizer booster for covid at your earliest convenience  3. Nonfasting blood test today  4. 1/2 boost w a scoop + ice in a  for mid morning and mid afternoon.

## 2021-10-01 DIAGNOSIS — I10 ESSENTIAL HYPERTENSION: ICD-10-CM

## 2021-10-01 DIAGNOSIS — E78.2 MIXED HYPERLIPIDEMIA: ICD-10-CM

## 2021-10-01 RX ORDER — ATORVASTATIN CALCIUM 40 MG/1
40 TABLET, FILM COATED ORAL DAILY
Qty: 90 TABLET | Refills: 1 | Status: SHIPPED | OUTPATIENT
Start: 2021-10-01 | End: 2022-06-20

## 2021-10-01 RX ORDER — AMLODIPINE BESYLATE 5 MG/1
TABLET ORAL
Qty: 135 TABLET | Refills: 3 | Status: SHIPPED | OUTPATIENT
Start: 2021-10-01 | End: 2022-09-16

## 2021-10-01 NOTE — TELEPHONE ENCOUNTER
Medication:   Requested Prescriptions     Pending Prescriptions Disp Refills    amLODIPine (NORVASC) 5 MG tablet [Pharmacy Med Name: AMLODIPINE BESYLATE 5MG TABLETS] 135 tablet 3     Sig: TAKE 1 AND 1/2 TABLETS BY MOUTH DAILY        Last Filled:      Patient Phone Number: [phone] (home)     Last appt: 9/20/2021   Next appt: 10/1/2021    Last OARRS: No flowsheet data found.

## 2021-10-08 RX ORDER — ACETAMINOPHEN 160 MG
2000 TABLET,DISINTEGRATING ORAL DAILY
Qty: 90 CAPSULE | Refills: 1 | Status: SHIPPED | OUTPATIENT
Start: 2021-10-08 | End: 2022-04-04

## 2021-10-08 NOTE — TELEPHONE ENCOUNTER
Dr uRiz Plasencia:  Can you sign these Rx's for Dr Rene Manriquez' patient? So they don't have to wait until Monday.

## 2021-12-24 RX ORDER — DOXYCYCLINE HYCLATE 100 MG
TABLET ORAL
Qty: 60 TABLET | Refills: 11 | Status: SHIPPED | OUTPATIENT
Start: 2021-12-24

## 2022-03-31 ENCOUNTER — OFFICE VISIT (OUTPATIENT)
Dept: PRIMARY CARE CLINIC | Age: 87
End: 2022-03-31
Payer: MEDICARE

## 2022-03-31 VITALS
WEIGHT: 139 LBS | HEIGHT: 60 IN | HEART RATE: 67 BPM | DIASTOLIC BLOOD PRESSURE: 69 MMHG | BODY MASS INDEX: 27.29 KG/M2 | SYSTOLIC BLOOD PRESSURE: 155 MMHG | OXYGEN SATURATION: 96 % | TEMPERATURE: 97 F

## 2022-03-31 DIAGNOSIS — I48.0 PAROXYSMAL ATRIAL FIBRILLATION (HCC): ICD-10-CM

## 2022-03-31 DIAGNOSIS — E03.9 ACQUIRED HYPOTHYROIDISM: Primary | ICD-10-CM

## 2022-03-31 DIAGNOSIS — E78.2 MIXED HYPERLIPIDEMIA: ICD-10-CM

## 2022-03-31 DIAGNOSIS — Z13.1 ENCOUNTER FOR SCREENING FOR DIABETES MELLITUS: ICD-10-CM

## 2022-03-31 DIAGNOSIS — E03.9 ACQUIRED HYPOTHYROIDISM: ICD-10-CM

## 2022-03-31 DIAGNOSIS — R73.01 IFG (IMPAIRED FASTING GLUCOSE): ICD-10-CM

## 2022-03-31 DIAGNOSIS — N18.4 CKD (CHRONIC KIDNEY DISEASE) STAGE 4, GFR 15-29 ML/MIN (HCC): ICD-10-CM

## 2022-03-31 DIAGNOSIS — I10 ESSENTIAL HYPERTENSION: ICD-10-CM

## 2022-03-31 LAB
ALT SERPL-CCNC: 25 U/L (ref 10–40)
BASOPHILS ABSOLUTE: 0 K/UL (ref 0–0.2)
BASOPHILS RELATIVE PERCENT: 0.2 %
EOSINOPHILS ABSOLUTE: 0.1 K/UL (ref 0–0.6)
EOSINOPHILS RELATIVE PERCENT: 1.1 %
GLUCOSE FASTING: 113 MG/DL (ref 70–99)
HBA1C MFR BLD: 5.1 %
HCT VFR BLD CALC: 31.5 % (ref 36–48)
HEMOGLOBIN: 10.4 G/DL (ref 12–16)
LYMPHOCYTES ABSOLUTE: 2.1 K/UL (ref 1–5.1)
LYMPHOCYTES RELATIVE PERCENT: 29.4 %
MCH RBC QN AUTO: 30.9 PG (ref 26–34)
MCHC RBC AUTO-ENTMCNC: 32.9 G/DL (ref 31–36)
MCV RBC AUTO: 94 FL (ref 80–100)
MONOCYTES ABSOLUTE: 0.5 K/UL (ref 0–1.3)
MONOCYTES RELATIVE PERCENT: 6.5 %
NEUTROPHILS ABSOLUTE: 4.4 K/UL (ref 1.7–7.7)
NEUTROPHILS RELATIVE PERCENT: 62.8 %
PDW BLD-RTO: 18.7 % (ref 12.4–15.4)
PLATELET # BLD: 191 K/UL (ref 135–450)
PMV BLD AUTO: 8.1 FL (ref 5–10.5)
RBC # BLD: 3.35 M/UL (ref 4–5.2)
T4 FREE: 1.5 NG/DL (ref 0.9–1.8)
TSH REFLEX: 5.71 UIU/ML (ref 0.27–4.2)
WBC # BLD: 7 K/UL (ref 4–11)

## 2022-03-31 PROCEDURE — 99212 OFFICE O/P EST SF 10 MIN: CPT | Performed by: INTERNAL MEDICINE

## 2022-03-31 PROCEDURE — 83036 HEMOGLOBIN GLYCOSYLATED A1C: CPT | Performed by: INTERNAL MEDICINE

## 2022-03-31 ASSESSMENT — PATIENT HEALTH QUESTIONNAIRE - PHQ9
1. LITTLE INTEREST OR PLEASURE IN DOING THINGS: 0
SUM OF ALL RESPONSES TO PHQ QUESTIONS 1-9: 0
2. FEELING DOWN, DEPRESSED OR HOPELESS: 0
SUM OF ALL RESPONSES TO PHQ9 QUESTIONS 1 & 2: 0
SUM OF ALL RESPONSES TO PHQ QUESTIONS 1-9: 0

## 2022-03-31 NOTE — PROGRESS NOTES
3/31/2022    Peyton Julian (:  1921) is a 80 y.o. female, here for evaluation of the following medical concerns:    Chief Complaint   Patient presents with    Follow-up    Diabetes       HPI  8-year-old -American female with diet controlled type 2 diabetes A1c 2021 6.0 %, treated hypertension and hyperlipidemia, chronic kidney disease stage IV creatinine ~2, coronary artery disease status post MI and CABG on nitrates Lasix, paroxysmal atrial fibrillation on amiodarone 100mg q day without recent symptomatic recurrence, hypothyroidism on replacement , who comes in for 6-month follow-up. She has right anterior mediastinal catheter that may have been there for decades, they do not remember any procedure or any prior knowledge,  and a abdominal wall foreign body on chronic doxycycline per ID. CT 2019 showed a drain in the right anterior mediastinum, present on CT 2017. She sees nephrology for her kidney disease and secondary renal hyperparathyroidism, seen this month, creatinine stable at 2, K 3.7, PTH 66 bicarb 19 without  hyperkalemia. Hemoglobin 10.4, down from 11.7 normal WBC. No changes made in her regimen. She is currently prescribed clopidogrel Imdur 60 omeprazole levothyroxine 12.5 micrograms daily, doxycycline 100 mg twice daily, amiodarone 100 mg daily amlodipine 7.5 mg daily atorvastatin 40 mg daily 2000 IU vitamin D daily and a probiotic. She is not on any diabetes medication currently. She was started on 12.5 mcg Synthroid 2021 for TSH 5.2 Free T4 1.2, along with increased somnolence and decreased engagement in previously enjoyable activities such as her TV shows. Her son wondered if she was depressed. Also, though she carries no diagnosis of dementia, she is 8 years old. Head CT  showed mild atrophy without evidence of stroke.   Since starting the Synthroid, he has not noticed any improvement in her energy level or hobby engagement. No sundowning behavior, as best I can make out. No high risk behaviors. No recent falls or head trauma. Review of Systems   Constitutional: Negative for activity change, appetite change, fatigue and unexpected weight change. HENT: Negative for dental problem, sinus pain, sore throat and trouble swallowing. Eyes: Negative for pain and visual disturbance. Respiratory: Negative for apnea, cough, chest tightness, shortness of breath and wheezing. Cardiovascular: Negative for chest pain and palpitations. Gastrointestinal: Negative for abdominal pain, blood in stool, constipation, diarrhea, nausea, rectal pain and vomiting. Endocrine: Negative for cold intolerance, heat intolerance, polydipsia, polyphagia and polyuria. Genitourinary: Negative for difficulty urinating, dysuria, flank pain, frequency, hematuria, pelvic pain, urgency, vaginal bleeding and vaginal discharge. Musculoskeletal: Negative for arthralgias, back pain, gait problem, joint swelling, myalgias, neck pain and neck stiffness. Skin: Negative for color change and rash. Neurological: Negative for dizziness, tremors, syncope, speech difficulty, weakness, light-headedness and headaches. Hematological: Negative for adenopathy. Does not bruise/bleed easily. Psychiatric/Behavioral: Negative for agitation, behavioral problems, decreased concentration, sleep disturbance and suicidal ideas. The patient is not nervous/anxious and is not hyperactive. Prior to Visit Medications    Medication Sig Taking? Authorizing Provider   isosorbide mononitrate (IMDUR) 60 MG extended release tablet TAKE 1 TABLET BY MOUTH DAILY Yes Brad Krishnan MD   clopidogrel (PLAVIX) 75 MG tablet TAKE 1 TABLET BY MOUTH DAILY Yes Brad Krishnan MD   doxycycline hyclate (VIBRA-TABS) 100 MG tablet TAKE 1 TABLET BY MOUTH TWICE DAILY Yes Jah Eckert MD   levothyroxine (SYNTHROID) 25 MCG tablet TAKE ONE-HALF TABLET DAILY.  MAKE AN APPOINTMENT BEFORE NEXT REFILL Yes Linette Figueroa MD   Cholecalciferol (VITAMIN D3) 50 MCG (2000 UT) CAPS Take 1 capsule by mouth daily Yes Scherry Lefort, MD   amLODIPine (NORVASC) 5 MG tablet TAKE 1 AND 1/2 TABLETS BY MOUTH DAILY Yes Linette Figueroa MD   atorvastatin (LIPITOR) 40 MG tablet TAKE 1 TABLET BY MOUTH DAILY Yes Linette Figueroa MD   amiodarone (CORDARONE) 200 MG tablet TAKE 1 TABLET BY MOUTH DAILY Yes Linette Figueroa MD   omeprazole (Madelaine Frame) 20 MG delayed release capsule TAKE 1 CAPSULE BY MOUTH EVERY DAY AS NEEDED FOR HEART BURN OR ACID REFLUX Yes Linette Figueroa MD   Lactobacillus (PROBIOTIC ACIDOPHILUS) TABS Take 1 tablet by mouth daily Yes Archie Monroe MD        Allergies   Allergen Reactions    No Known Allergies        Past Medical History:   Diagnosis Date    Anemia     CKD (chronic kidney disease)     Diabetes ([facility] [address]     History of myocardial infarction     Hyperlipidemia     Hypertension     [facility]     Due to chronic kidney disease    Vitamin D deficiency        Past Surgical History:   Procedure Laterality Date    ANKLE FRACTURE SURGERY      CATARACT REMOVAL WITH IMPLANT Bilateral     Losartan in November 2015 by Dr. Shawnee Bonilla History     Socioeconomic History    Marital status:       Spouse name: Not on file    Number of children: Not on file    Years of education: Not on file    Highest education level: Not on file   Occupational History    Not on file   Tobacco Use    Smoking status: Never Smoker    Smokeless tobacco: Never Used   Vaping Use    Vaping Use: Never used   Substance and Sexual Activity    Alcohol use: Not Currently    Drug use: Never    Sexual activity: Not Currently     Partners: Male   Other Topics Concern    Not on file   Social History Narrative    Not on file     Social Determinants of Health     Financial Resource Strain: Low Risk     Difficulty of Paying Living Expenses: Not hard at all   Food Insecurity: No Food Insecurity    Worried About [facility] in the Last Year: Never true    Rodolfo of Food in the Last Year: Never true   Transportation Needs:     Lack of Transportation (Medical): Not on file    Lack of Transportation (Non-Medical): Not on file   Physical Activity:     Days of Exercise per Week: Not on file    Minutes of Exercise per Session: Not on file   Stress:     Feeling of Stress : Not on file   Social Connections:     Frequency of Communication with Friends and Family: Not on file    Frequency of Social Gatherings with Friends and Family: Not on file    Attends Christian Services: Not on file    Active Member of [address] [place] [address] or Organizations: Not on file    Attends Club or Organization Meetings: Not on file    Marital Status: Not on file   Intimate Partner Violence:     Fear of Current or Ex-Partner: Not on file    Emotionally Abused: Not on file    Physically Abused: Not on file    Sexually Abused: Not on file   Housing Stability:     Unable to Pay for Housing in the Last Year: Not on file    Number of [place] in the Last Year: Not on file    Unstable Housing in the Last Year: Not on file        No family history on file. Vitals:    03/31/22 0944 03/31/22 0947   BP: (!) 159/69 (!) 155/69   Pulse: 71 67   Temp: 97 °F (36.1 °C)    TempSrc: Temporal    SpO2: 96%    Weight: 139 lb (63 kg)    Height: 5' (1.524 m)      Estimated body mass index is 27.15 kg/m² as calculated from the following:    Height as of this encounter: 5' (1.524 m). Weight as of this encounter: 139 lb (63 kg). PHYSICAL EXAM  GENERAL:  Pleasant  female who looks her stated age, awake alert and oriented x2, no acute distress. Virtually nonverbal.  Hard of hearing. HEENT:  Normocephalic atraumatic. Pupils equal round reactive light and accommodation, extra ocular muscles are intact. Oropharynx is clear moist without injection or exudate.   Tongue and palate move normally. Turbinates appear normal.  Tympanic membranes appear normal.  NECK:  Supple nontender. No carotid bruits. Brisk carotid upstrokes, no JVD. No thyromegaly. LYMPH:  No supraclavicular cervical axillary or inguinal lymphadenopathy. LUNGS:  Clear to auscultation bilaterally. Very Good air entry. No inspiratory crackles or expiratory wheezes. HEART:  Regular rate and rhythm with MR murmur rub gallop S3 or S4. ABDOMEN:  Soft, nontender. Normal bowel sounds. No guarding. No masses. UROGENITAL:  Deferred  EXTREMITIES:  Warm and well perfused without clubbing cyanosis 1-2+ ankle edema bilaterally. 2+ pulses in all 4 extremities. Capillary refill less than 2 seconds. NEURO: Mild cognitive impairment, hard of hearing bilaterally. Normal muscle bulk and tone. No resting tremor, cogwheeling, normal rapid alternating movements in the hands and feet. No stocking paresthesia. Did not assess gait and station. MUSCULOSKELETAL: Moderate osteoarthritic changes. No active synovitis. Get up and go under 11 seconds. SKIN:  No worrisome lesions, skin a little dry. PSYCH:  No psychomotor retardation or agitation. Good eye contact. Unrestricted affect range. Mood congruent with affect. Linear thought. LABS  Lab Review   Orders Only on 10/01/2021   Component Date Value    Visual Acuity Distance R* 10/01/2021 20/25     Visual Acuity Distance L* 10/01/2021 20/50     Intraocular Pressure Eye 10/01/2021                      Value:14  14      Diabetic Retinopathy 10/01/2021 NEGATIVE     Cataracts 10/01/2021 NEGATIVE     Glaucoma 10/01/2021 NEGATIVE    Orders Only on 09/20/2021   Component Date Value    Hemoglobin A1C 09/20/2021 6.0     eAG 09/20/2021 125.5     T4 Free 09/20/2021 1.5     Fructosamine 09/20/2021 362*   Orders Only on 03/22/2021   Component Date Value    TSH 03/22/2021 3.62     Hemoglobin A1C 03/22/2021 6.7     eAG 03/22/2021 145.6          ASSESSMENT/PLAN  1.  Type 2 diabetes mellitus with complication, without long-term current use of insulin (HCC)  Given renal disease, will check fructosamine and A1c to better assess level of glycemic control by diet alone. On statin and Plavix at age [address]ortencia Stacie [age], no recent falls, no significant anemia.  - Hemoglobin A1C; Future  - FRUCTOSAMINE; Future    2. Acquired hypothyroidism  Update TSH free T4.    3. Mixed hyperlipidemia  Update ALT. Would not do dose change at 101. Nephrology evidently satisfied with atorvastatin 40 mg daily. 4. CKD (chronic kidney disease) stage 4, GFR 15-29 ml/min ([facility] [address]  Sees nephrology in 6 months. Cr stable 2.1, bicarb 21-22, normal Na and K    5. Paroxysmal atrial fibrillation (HCC)  Chronic amiodarone 100 mg. Lung exam is pretty reassuring, updating TSH to ensure adequacy of replacement. Has not seen an eye doctor    6. Therapeutic drug monitoring  Chronic doxycycline therapy demands, monitor ALT CBC creatinine twice a year. Update at this time, repeat in 6 months    7. Fatigue. Multifactorial.  Mild cognitive impairment, chronic renal disease, extreme age, adjustment disorder related to inability to do previously enjoyable more strenuous activities, increased social isolation due to winter and Covid. Thyroid Supplementation does not appear to have improved the situation. Patient does not smile is almost nonverbal at this time. She is not interested in hearing aids, which does of course increase her social isolation. Perhaps a trial of \"pocket talker\" would help. 8. Healthcare maintenance. Up-to-date with Pneumovax 23. No Tdap in years. Received Covid vaccinations. Will boost w Evergig and provide flu shot today. Return in about 6 months (around 2022). It was a pleasure to visit with Ms. Moctezuma Alina today. Answered all questions as best I could.   Linette Figueroa MD   Time 15 minutes

## 2022-04-01 LAB
ESTIMATED AVERAGE GLUCOSE: 102.5 MG/DL
HBA1C MFR BLD: 5.2 %

## 2022-04-02 LAB — FRUCTOSAMINE: 306 UMOL/L (ref 205–285)

## 2022-04-06 RX ORDER — ACETAMINOPHEN 160 MG
2000 TABLET,DISINTEGRATING ORAL DAILY
Qty: 90 CAPSULE | Refills: 1 | Status: SHIPPED | OUTPATIENT
Start: 2022-04-06 | End: 2022-10-18 | Stop reason: SDUPTHER

## 2022-04-08 RX ORDER — AMIODARONE HYDROCHLORIDE 200 MG/1
TABLET ORAL
Qty: 90 TABLET | Refills: 1 | Status: SHIPPED | OUTPATIENT
Start: 2022-04-08 | End: 2022-05-27

## 2022-04-08 NOTE — TELEPHONE ENCOUNTER
PT called in requesting refill for Amiodarone. Please send to the [facility].

## 2022-05-09 DIAGNOSIS — E03.9 ACQUIRED HYPOTHYROIDISM: ICD-10-CM

## 2022-05-09 RX ORDER — LEVOTHYROXINE SODIUM 0.03 MG/1
TABLET ORAL
Qty: 45 TABLET | Refills: 1 | Status: SHIPPED | OUTPATIENT
Start: 2022-05-09 | End: 2022-10-19 | Stop reason: SDUPTHER

## 2022-05-27 RX ORDER — AMIODARONE HYDROCHLORIDE 200 MG/1
TABLET ORAL
Qty: 90 TABLET | Refills: 1 | Status: SHIPPED | OUTPATIENT
Start: 2022-05-27 | End: 2022-10-21 | Stop reason: SDUPTHER

## 2022-06-18 DIAGNOSIS — E78.2 MIXED HYPERLIPIDEMIA: ICD-10-CM

## 2022-06-20 RX ORDER — ATORVASTATIN CALCIUM 40 MG/1
40 TABLET, FILM COATED ORAL DAILY
Qty: 90 TABLET | Refills: 1 | Status: SHIPPED | OUTPATIENT
Start: 2022-06-20

## 2022-06-20 NOTE — TELEPHONE ENCOUNTER
Medication:   Requested Prescriptions     Pending Prescriptions Disp Refills    atorvastatin (LIPITOR) 40 MG tablet [Pharmacy Med Name: ATORVASTATIN 40MG TABLETS] 90 tablet 1     Sig: TAKE 1 TABLET BY MOUTH DAILY        Last Filled:      Patient Phone Number: [phone] (home)     Last appt: 3/31/2022   Next appt: Visit date not found    Last OARRS: No flowsheet data found.

## 2022-06-30 ENCOUNTER — NURSE TRIAGE (OUTPATIENT)
Dept: OTHER | Facility: CLINIC | Age: 87
End: 2022-06-30

## 2022-06-30 NOTE — TELEPHONE ENCOUNTER
Received call from Pam Aguirre at [facility] with Red Flag Complaint. Speaking with patient's son Vaibhav Granados who says he is with Andrzej Andrews    Subjective: Caller states \"She is having trouble holding her BM and some pain around her left lower stomach area\"     Current Symptoms: Left sided abdominal pain, diarrhea \"not too often\", vomiting \"a little bit\"  States - \"the whites of her eyes look yellow\"        Onset: \"quite a while\"      Pain Severity: \"I don't believe it is that severe\"    Temperature: denies      Denies - black or tarry stool           Recommended disposition: Go to [facility]    Care advice provided, patient verbalizes understanding; denies any other questions or concerns; instructed to call back for any new or worsening symptoms. Patient/Caller agrees with recommended disposition; writer provided warm transfer to Contreras Hopkins at [facility] for appointment scheduling     Attention Provider: Thank you for allowing me to participate in the care of your patient. The patient was connected to triage in response to information provided to the ECC/PSC. Please do not respond through this encounter as the response is not directed to a shared pool.           Reason for Disposition   White of the eyes have turned yellow (i.e., jaundice)    Protocols used: ABDOMINAL PAIN - [facility]

## 2022-07-01 ENCOUNTER — OFFICE VISIT (OUTPATIENT)
Dept: PRIMARY CARE CLINIC | Age: 87
End: 2022-07-01
Payer: MEDICARE

## 2022-07-01 VITALS
BODY MASS INDEX: 25.58 KG/M2 | SYSTOLIC BLOOD PRESSURE: 129 MMHG | TEMPERATURE: 98.4 F | OXYGEN SATURATION: 99 % | HEART RATE: 78 BPM | WEIGHT: 131 LBS | DIASTOLIC BLOOD PRESSURE: 63 MMHG

## 2022-07-01 DIAGNOSIS — R10.32 LLQ PAIN: Primary | ICD-10-CM

## 2022-07-01 DIAGNOSIS — Z79.899 LONG-TERM USE OF HIGH-RISK MEDICATION: ICD-10-CM

## 2022-07-01 DIAGNOSIS — E11.8 TYPE 2 DIABETES MELLITUS WITH COMPLICATION, WITHOUT LONG-TERM CURRENT USE OF INSULIN (HCC): ICD-10-CM

## 2022-07-01 DIAGNOSIS — N18.4 CKD (CHRONIC KIDNEY DISEASE) STAGE 4, GFR 15-29 ML/MIN (HCC): ICD-10-CM

## 2022-07-01 DIAGNOSIS — R10.32 LLQ PAIN: ICD-10-CM

## 2022-07-01 DIAGNOSIS — E03.9 ACQUIRED HYPOTHYROIDISM: ICD-10-CM

## 2022-07-01 DIAGNOSIS — N18.32 STAGE 3B CHRONIC KIDNEY DISEASE (HCC): ICD-10-CM

## 2022-07-01 LAB
A/G RATIO: 1.5 (ref 1.1–2.2)
ALBUMIN SERPL-MCNC: 4 G/DL (ref 3.4–5)
ALP BLD-CCNC: 130 U/L (ref 40–129)
ALT SERPL-CCNC: 35 U/L (ref 10–40)
ANION GAP SERPL CALCULATED.3IONS-SCNC: 18 MMOL/L (ref 3–16)
AST SERPL-CCNC: 42 U/L (ref 15–37)
BASOPHILS ABSOLUTE: 0.1 K/UL (ref 0–0.2)
BASOPHILS RELATIVE PERCENT: 1 %
BILIRUB SERPL-MCNC: 0.7 MG/DL (ref 0–1)
BILIRUBIN, POC: NEGATIVE
BLOOD URINE, POC: NEGATIVE
BUN BLDV-MCNC: 20 MG/DL (ref 7–20)
C-REACTIVE PROTEIN: 3.2 MG/L (ref 0–5.1)
CALCIUM SERPL-MCNC: 9.3 MG/DL (ref 8.3–10.6)
CHLORIDE BLD-SCNC: 104 MMOL/L (ref 99–110)
CLARITY, POC: CLEAR
CO2: 20 MMOL/L (ref 21–32)
COLOR, POC: YELLOW
CREAT SERPL-MCNC: 2.1 MG/DL (ref 0.6–1.2)
EOSINOPHILS ABSOLUTE: 0 K/UL (ref 0–0.6)
EOSINOPHILS RELATIVE PERCENT: 0.4 %
GFR AFRICAN AMERICAN: 26
GFR NON-AFRICAN AMERICAN: 22
GLUCOSE BLD-MCNC: 120 MG/DL (ref 70–99)
GLUCOSE URINE, POC: NEGATIVE
HCT VFR BLD CALC: 30.5 % (ref 36–48)
HEMOGLOBIN: 10.4 G/DL (ref 12–16)
KETONES, POC: NEGATIVE
LACTIC ACID: 2.4 MMOL/L (ref 0.4–2)
LEUKOCYTE EST, POC: NEGATIVE
LYMPHOCYTES ABSOLUTE: 2 K/UL (ref 1–5.1)
LYMPHOCYTES RELATIVE PERCENT: 26.9 %
MCH RBC QN AUTO: 31.1 PG (ref 26–34)
MCHC RBC AUTO-ENTMCNC: 34.1 G/DL (ref 31–36)
MCV RBC AUTO: 91.1 FL (ref 80–100)
MONOCYTES ABSOLUTE: 0.5 K/UL (ref 0–1.3)
MONOCYTES RELATIVE PERCENT: 6.4 %
NEUTROPHILS ABSOLUTE: 4.8 K/UL (ref 1.7–7.7)
NEUTROPHILS RELATIVE PERCENT: 65.3 %
NITRITE, POC: NEGATIVE
PDW BLD-RTO: 18.5 % (ref 12.4–15.4)
PH, POC: 5.5
PLATELET # BLD: 152 K/UL (ref 135–450)
PMV BLD AUTO: 7.8 FL (ref 5–10.5)
POTASSIUM SERPL-SCNC: 3.4 MMOL/L (ref 3.5–5.1)
PROTEIN, POC: 30
RBC # BLD: 3.35 M/UL (ref 4–5.2)
SODIUM BLD-SCNC: 142 MMOL/L (ref 136–145)
SPECIFIC GRAVITY, POC: 1.02
T4 FREE: 1.7 NG/DL (ref 0.9–1.8)
TOTAL PROTEIN: 6.6 G/DL (ref 6.4–8.2)
UROBILINOGEN, POC: 0.2
WBC # BLD: 7.3 K/UL (ref 4–11)

## 2022-07-01 PROCEDURE — 1123F ACP DISCUSS/DSCN MKR DOCD: CPT | Performed by: INTERNAL MEDICINE

## 2022-07-01 PROCEDURE — 81002 URINALYSIS NONAUTO W/O SCOPE: CPT | Performed by: INTERNAL MEDICINE

## 2022-07-01 PROCEDURE — 99213 OFFICE O/P EST LOW 20 MIN: CPT | Performed by: INTERNAL MEDICINE

## 2022-07-01 PROCEDURE — 3044F HG A1C LEVEL LT 7.0%: CPT | Performed by: INTERNAL MEDICINE

## 2022-07-01 RX ORDER — OLOPATADINE HYDROCHLORIDE 1 MG/ML
1 SOLUTION/ DROPS OPHTHALMIC 2 TIMES DAILY
Qty: 5 ML | Refills: 1 | Status: SHIPPED | OUTPATIENT
Start: 2022-07-01 | End: 2022-07-31

## 2022-07-01 ASSESSMENT — PATIENT HEALTH QUESTIONNAIRE - PHQ9
1. LITTLE INTEREST OR PLEASURE IN DOING THINGS: 0
SUM OF ALL RESPONSES TO PHQ9 QUESTIONS 1 & 2: 0
2. FEELING DOWN, DEPRESSED OR HOPELESS: 0
SUM OF ALL RESPONSES TO PHQ QUESTIONS 1-9: 0

## 2022-07-01 NOTE — PATIENT INSTRUCTIONS
1.  Blood test today  2. Urine test today  3. Metamucil daily with 8 ounces of water. Make sure that you are drinking at least a quart of water a day  4. Eyedrops provided. Given history of amiodarone use, please make appointment to see eye doctor.

## 2022-07-01 NOTE — PROGRESS NOTES
2022    Delilah Lorenzo (:  1921) is a [age] y.o. female, here for evaluation of the following medical concerns:    No chief complaint on file. 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 \"    She has right anterior mediastinal catheter that may have been there for decades, they do not remember any procedure or any prior knowledge,  and a abdominal wall foreign body on chronic doxycycline per ID. CT 2019 showed a drain in the right anterior mediastinum, present on CT 2017. She sees nephrology for her kidney disease and secondary renal hyperparathyroidism, seen this month, creatinine stable at 2, K 3.7, PTH 66 bicarb 19 without  hyperkalemia. Hemoglobin 10.4, down from 11.7 normal WBC. No changes made in her regimen. She is currently prescribed clopidogrel Imdur 60 omeprazole levothyroxine 12.5 micrograms daily, doxycycline 100 mg twice daily, amiodarone 100 mg daily amlodipine 7.5 mg daily atorvastatin 40 mg daily 2000 IU vitamin D daily and a probiotic. She is not on any diabetes medication currently. She was started on 12.5 mcg Synthroid 2021 for TSH 5.2 Free T4 1.2, along with increased somnolence and decreased engagement in previously enjoyable activities such as her TV shows. Her son wondered if she was depressed. Also, though she carries no diagnosis of dementia, she is 8 years old. Head CT  showed mild atrophy without evidence of stroke. Since starting the Synthroid, he has not noticed any improvement in her energy level or hobby engagement. No sundowning behavior, as best I can make out. No high risk behaviors. No recent falls or head trauma. Review of Systems   Constitutional: Negative for activity change, appetite change, fatigue and unexpected weight change. HENT: Negative for dental problem, sinus pain, sore throat and trouble swallowing. Eyes: Negative for pain and visual disturbance. Respiratory: Negative for apnea, cough, chest tightness, shortness of breath and wheezing. Cardiovascular: Negative for chest pain and palpitations. Gastrointestinal: Negative for abdominal pain, blood in stool, constipation, diarrhea, nausea, rectal pain and vomiting. Endocrine: Negative for cold intolerance, heat intolerance, polydipsia, polyphagia and polyuria. Genitourinary: Negative for difficulty urinating, dysuria, flank pain, frequency, hematuria, pelvic pain, urgency, vaginal bleeding and vaginal discharge. Musculoskeletal: Negative for arthralgias, back pain, gait problem, joint swelling, myalgias, neck pain and neck stiffness. Skin: Negative for color change and rash. Neurological: Negative for dizziness, tremors, syncope, speech difficulty, weakness, light-headedness and headaches. Hematological: Negative for adenopathy. Does not bruise/bleed easily. Psychiatric/Behavioral: Negative for agitation, behavioral problems, decreased concentration, sleep disturbance and suicidal ideas. The patient is not nervous/anxious and is not hyperactive. Prior to Visit Medications    Medication Sig Taking?  Authorizing Provider   atorvastatin (LIPITOR) 40 MG tablet TAKE 1 TABLET BY MOUTH DAILY  Flavio Espinoza MD   amiodarone (CORDARONE) 200 MG tablet TAKE 1 TABLET BY MOUTH DAILY  Alexia Wood MD   levothyroxine (SYNTHROID) 25 MCG tablet TAKE 1/2 TABLET BY MOUTH DAILY(MAKE AN APPOINTMENT BEFORE NEXT REFILL)  Flavio Espinoza MD   Cholecalciferol (VITAMIN D3) 50 MCG (2000 UT) CAPS Take 1 capsule by mouth daily  Rex Be MD   isosorbide mononitrate (IMDUR) 60 MG extended release tablet TAKE 1 TABLET BY MOUTH DAILY  Rex Be MD   clopidogrel (PLAVIX) 75 MG tablet TAKE 1 TABLET BY MOUTH DAILY  Rex Be MD   doxycycline hyclate (VIBRA-TABS) 100 MG tablet TAKE 1 TABLET BY MOUTH TWICE DAILY  Luis Gold MD   amLODIPine (NORVASC) 5 MG tablet TAKE 1 AND 1/2 TABLETS BY MOUTH DAILY  Rex Be MD   omeprazole (Rutha Conception) 20 MG delayed release capsule TAKE 1 CAPSULE BY MOUTH EVERY DAY AS NEEDED FOR HEART BURN OR ACID REFLUX  Rex Be MD   Lactobacillus (PROBIOTIC ACIDOPHILUS) TABS Take 1 tablet by mouth daily  Kailash Pugh MD        Allergies   Allergen Reactions    No Known Allergies        Past Medical History:   Diagnosis Date    Anemia     CKD (chronic kidney disease)     [facility] ([facility] [address]     History of myocardial infarction     Hyperlipidemia     Hypertension     [facility] hyperpar[facility]roidism [facility]     Due to chronic kidney disease    Vitamin D deficiency        Past Surgical History:   Procedure Laterality Date    ANKLE FRACTURE SURGERY      CATARACT EXTRACTION W/  INTRAOCULAR LENS IMPLANT Bilateral     Losartan in November 2015 by Dr. Lisa Garcia History     Socioeconomic History    Marital status:       Spouse name: Not on file    Number of children: Not on file    Years of education: Not on file    Highest education level: Not on file   Occupational History    Not on file   Tobacco Use    Smoking status: Never Smoker    Smokeless tobacco: Never Used   Vaping Use    Vaping Use: Never used   Substance and Sexual Activity    Alcohol use: Not Currently    Drug use: Never    Sexual activity: Not Currently     Partners: Male   Other Topics Concern    Not on file   Social History Narrative    Not on file     Social Determinants of Health Financial Resource Strain: Low Risk     Difficulty of Paying Living Expenses: Not hard at all   Food Insecurity: No Food Insecurity    Worried About Running Out of Food in the Last Year: Never true    Rodolfo of Food in the Last Year: Never true   Transportation Needs:     Lack of Transportation (Medical): Not on file    Lack of Transportation (Non-Medical): Not on file   Physical Activity:     Days of Exercise per Week: Not on file    Minutes of Exercise per Session: Not on file   Stress:     Feeling of Stress : Not on file   Social Connections:     Frequency of Communication with Friends and Family: Not on file    Frequency of Social Gatherings with Friends and Family: Not on file    Attends Mandaeism Services: Not on file    Active Member of [address] [place] [address] Tast[employer] or Organizations: Not on file    Attends Club or Organization Meetings: Not on file    Marital Status: Not on file   Intimate Partner Violence:     Fear of Current or Ex-Partner: Not on file    Emotionally Abused: Not on file    Physically Abused: Not on file    Sexually Abused: Not on file   Housing Stability:     Unable to Pay for Housing in the Last Year: Not on file    Number of [place] in the Last Year: Not on file    Unstable Housing in the Last Year: Not on file        No family history on file. There were no vitals filed for this visit. Estimated body mass index is 27.15 kg/m² as calculated from the following:    Height as of 3/31/22: 5' (1.524 m). Weight as of 3/31/22: 139 lb (63 kg). PHYSICAL EXAM  GENERAL:  Pleasant  female who looks her stated age, awake alert and oriented x2, no acute distress. Virtually nonverbal.  Hard of hearing. HEENT:  Normocephalic atraumatic. Pupils equal round reactive light and accommodation, extra ocular muscles are intact. Chemosis without scleral icterus. Oropharynx is clear moist without injection or exudate. Tongue and palate move normally.   Turbinates appear 03/31/2022 0.0     TSH 03/31/2022 5.71*    ALT 03/31/2022 25     T4 Free 03/31/2022 1.5    Office Visit on 03/31/2022   Component Date Value    Hemoglobin A1C 03/31/2022 5.1    Orders Only on 10/01/2021   Component Date Value    Visual Acuity Distance R* 10/01/2021 20/25     Visual Acuity Distance L* 10/01/2021 20/50     Intraocular Pressure Eye 10/01/2021                      Value:14  14      Diabetic Retinopathy 10/01/2021 NEGATIVE     Cataracts 10/01/2021 NEGATIVE     Glaucoma 10/01/2021 NEGATIVE    Orders Only on 09/20/2021   Component Date Value    Hemoglobin A1C 09/20/2021 6.0     eAG 09/20/2021 125.5     T4 Free 09/20/2021 1.5     Fructosamine 09/20/2021 362*         ASSESSMENT/PLAN  1. Type 2 diabetes mellitus with complication, without long-term current use of insulin (HCC)  Given renal disease, update fructosamine, compliant with diabetic diet drinking a lot of Coca[employer]Cola. On statin and Plavix at age [age], no recent falls, slight drop in hemoglobin on last test updating now. .    - FRUCTOSAMINE; Future    2. Acquired hypothyroidism  Update free T4.    3. Mixed hyperlipidemia  Update ALT. Would not do dose change at 101. Nephrology evidently satisfied with atorvastatin 40 mg daily, though. 4. CKD (chronic kidney disease) stage 4, GFR 15-29 ml/min ([facility] [address]  Sees nephrology in 6 months. Cr stable 2.1, bicarb 21-22, normal Na and K    5. Paroxysmal atrial fibrillation ([address] History of coronary artery disease. Statin Plavix chronic amiodarone 100 mg. Lung exam is pretty reassuring, updating TSH to ensure adequacy of replacement. Has not seen an eye doctor in some time encouraged to do so today. 6. Therapeutic drug monitoring  Chronic doxycycline therapy demands, monitor ALT CBC creatinine twice a year. Update at this time, repeat in 6 months    7.   Bilateral chemosis  To be related to amiodarone stressed she really needs to pursue eye exam.  We will try Patanol in case this is related to allergies. 8. Healthcare maintenance. Up-to-date with Pneumovax 23. No Tdap in years. COVID vaccinated through booster #1.    9.  Constipation. Adequate fiber fluids advised. Reassuring abdominal exam.  Normal bowel sounds. 10.  Inconsistent left lower quadrant abdominal pain. Reassuring exam normal bowel sounds, check labs to determine if CT indicated. No follow-ups on file. It was a pleasure to visit with Ms. Sudha Schwartz today. Answered all questions as best I could.   Delilah Valdez MD   Time 15 minutes

## 2022-07-03 LAB — FRUCTOSAMINE: 307 UMOL/L (ref 205–285)

## 2022-07-17 DIAGNOSIS — I25.10 CORONARY ARTERY DISEASE INVOLVING NATIVE CORONARY ARTERY OF NATIVE HEART WITHOUT ANGINA PECTORIS: ICD-10-CM

## 2022-07-18 ENCOUNTER — TELEPHONE (OUTPATIENT)
Dept: PRIMARY CARE CLINIC | Age: 87
End: 2022-07-18

## 2022-07-18 NOTE — TELEPHONE ENCOUNTER
Medication refill  pending request per provider, per call from 7/17/22; isosorbide mononitrate (IMDUR) 60 MG

## 2022-07-19 NOTE — TELEPHONE ENCOUNTER
Future Appointments    This patient does not currently have any appointments scheduled.     Past Visits    Date Provider Specialty Visit Type Primary Dx   07/01/2022 Uzair Owen MD Primary Care Office Visit LLQ pain   03/31/2022 Uzair Owen MD Primary Care Office Visit Acquired hypothyroidism

## 2022-07-20 RX ORDER — ISOSORBIDE MONONITRATE 60 MG/1
60 TABLET, EXTENDED RELEASE ORAL DAILY
Qty: 90 TABLET | Refills: 1 | Status: SHIPPED | OUTPATIENT
Start: 2022-07-20

## 2022-09-16 DIAGNOSIS — I25.10 CORONARY ARTERY DISEASE INVOLVING NATIVE CORONARY ARTERY OF NATIVE HEART WITHOUT ANGINA PECTORIS: ICD-10-CM

## 2022-09-16 DIAGNOSIS — I10 ESSENTIAL HYPERTENSION: ICD-10-CM

## 2022-09-16 RX ORDER — AMLODIPINE BESYLATE 5 MG/1
TABLET ORAL
Qty: 135 TABLET | Refills: 3 | Status: SHIPPED | OUTPATIENT
Start: 2022-09-16 | End: 2022-10-18

## 2022-09-16 RX ORDER — CLOPIDOGREL BISULFATE 75 MG/1
75 TABLET ORAL DAILY
Qty: 90 TABLET | Refills: 1 | Status: SHIPPED | OUTPATIENT
Start: 2022-09-16 | End: 2022-10-18

## 2022-09-16 NOTE — TELEPHONE ENCOUNTER
Future Appointments    This patient does not currently have any appointments scheduled.   Past Visits    Date Provider Specialty Visit Type Primary Dx   07/01/2022 Sully Deleon MD Primary Care Office Visit LLQ pain   03/31/2022 Sully Deleon MD Primary Care Office Visit Acquired hypothyroidism

## 2022-09-29 ENCOUNTER — TELEPHONE (OUTPATIENT)
Dept: PRIMARY CARE CLINIC | Age: 87
End: 2022-09-29

## 2022-09-29 NOTE — TELEPHONE ENCOUNTER
Diabetic Retinopathy Exam  Order: [id number]  Status: Final result    Visible to patient: No (not released)    0 Result Notes  Component 07:51    Visual Acuity Distance Right Eye 20/30    Visual Acuity Distance Left Eye 20/50    Intraocular Pressure Eye 15   16    Diabetic Retinopathy NEGATIVE    Cataracts NEGATIVE    Glaucoma NEGATIVE    Resulting Agency [facility]              Specimen Collected: 09/29/22 07:51 EDT Last Resulted: 09/29/22 07:51 EDT        Lab Flowsheet     Order Details     View Encounter     Lab and Collection Details     Routing     Result History     View Encounter Conversation           Result Care Coordination      Patient Communication     Not Rele

## 2022-10-18 ENCOUNTER — OFFICE VISIT (OUTPATIENT)
Dept: PRIMARY CARE CLINIC | Age: 87
End: 2022-10-18
Payer: MEDICARE

## 2022-10-18 VITALS
BODY MASS INDEX: 26.7 KG/M2 | TEMPERATURE: 97.3 F | SYSTOLIC BLOOD PRESSURE: 180 MMHG | HEART RATE: 61 BPM | DIASTOLIC BLOOD PRESSURE: 80 MMHG | WEIGHT: 136 LBS | OXYGEN SATURATION: 95 % | HEIGHT: 60 IN

## 2022-10-18 DIAGNOSIS — I35.0 AORTIC STENOSIS, MODERATE: ICD-10-CM

## 2022-10-18 DIAGNOSIS — N18.4 CKD (CHRONIC KIDNEY DISEASE) STAGE 4, GFR 15-29 ML/MIN (HCC): ICD-10-CM

## 2022-10-18 DIAGNOSIS — N25.81 SECONDARY RENAL HYPERPARATHYROIDISM (HCC): ICD-10-CM

## 2022-10-18 DIAGNOSIS — E78.2 MIXED HYPERLIPIDEMIA: ICD-10-CM

## 2022-10-18 DIAGNOSIS — E55.9 VITAMIN D DEFICIENCY: ICD-10-CM

## 2022-10-18 DIAGNOSIS — E03.9 ACQUIRED HYPOTHYROIDISM: ICD-10-CM

## 2022-10-18 DIAGNOSIS — E11.8 TYPE 2 DIABETES MELLITUS WITH COMPLICATION, WITHOUT LONG-TERM CURRENT USE OF INSULIN (HCC): ICD-10-CM

## 2022-10-18 DIAGNOSIS — Z00.00 INITIAL MEDICARE ANNUAL WELLNESS VISIT: Primary | ICD-10-CM

## 2022-10-18 DIAGNOSIS — I25.2 HISTORY OF MYOCARDIAL INFARCTION: ICD-10-CM

## 2022-10-18 DIAGNOSIS — R60.0 BILATERAL LEG EDEMA: ICD-10-CM

## 2022-10-18 DIAGNOSIS — Z23 NEED FOR INFLUENZA VACCINATION: ICD-10-CM

## 2022-10-18 PROBLEM — I21.9 MYOCARDIAL INFARCTION (HCC): Status: RESOLVED | Noted: 2020-11-06 | Resolved: 2022-10-18

## 2022-10-18 LAB
A/G RATIO: 1.7 (ref 1.1–2.2)
ALBUMIN SERPL-MCNC: 4.3 G/DL (ref 3.4–5)
ALP BLD-CCNC: 155 U/L (ref 40–129)
ALT SERPL-CCNC: 20 U/L (ref 10–40)
ANION GAP SERPL CALCULATED.3IONS-SCNC: 15 MMOL/L (ref 3–16)
AST SERPL-CCNC: 22 U/L (ref 15–37)
BACTERIA: ABNORMAL /HPF
BASOPHILS ABSOLUTE: 0 K/UL (ref 0–0.2)
BASOPHILS RELATIVE PERCENT: 0.6 %
BILIRUB SERPL-MCNC: 0.5 MG/DL (ref 0–1)
BILIRUBIN URINE: NEGATIVE
BLOOD, URINE: NEGATIVE
BUN BLDV-MCNC: 18 MG/DL (ref 7–20)
CALCIUM SERPL-MCNC: 8.8 MG/DL (ref 8.3–10.6)
CHLORIDE BLD-SCNC: 106 MMOL/L (ref 99–110)
CHOLESTEROL, TOTAL: 184 MG/DL (ref 0–199)
CLARITY: CLEAR
CO2: 23 MMOL/L (ref 21–32)
COLOR: YELLOW
CREAT SERPL-MCNC: 1.9 MG/DL (ref 0.6–1.2)
EOSINOPHILS ABSOLUTE: 0 K/UL (ref 0–0.6)
EOSINOPHILS RELATIVE PERCENT: 0.2 %
EPITHELIAL CELLS, UA: 1 /HPF (ref 0–5)
GFR SERPL CREATININE-BSD FRML MDRD: 23 ML/MIN/{1.73_M2}
GLUCOSE BLD-MCNC: 106 MG/DL (ref 70–99)
GLUCOSE URINE: 100 MG/DL
HCT VFR BLD CALC: 31.3 % (ref 36–48)
HDLC SERPL-MCNC: 103 MG/DL (ref 40–60)
HEMOGLOBIN: 10.4 G/DL (ref 12–16)
HYALINE CASTS: 0 /LPF (ref 0–8)
KETONES, URINE: NEGATIVE MG/DL
LDL CHOLESTEROL CALCULATED: 65 MG/DL
LEUKOCYTE ESTERASE, URINE: NEGATIVE
LYMPHOCYTES ABSOLUTE: 1.1 K/UL (ref 1–5.1)
LYMPHOCYTES RELATIVE PERCENT: 13.2 %
MCH RBC QN AUTO: 31.3 PG (ref 26–34)
MCHC RBC AUTO-ENTMCNC: 33.2 G/DL (ref 31–36)
MCV RBC AUTO: 94.1 FL (ref 80–100)
MICROSCOPIC EXAMINATION: YES
MONOCYTES ABSOLUTE: 0.4 K/UL (ref 0–1.3)
MONOCYTES RELATIVE PERCENT: 5.3 %
NEUTROPHILS ABSOLUTE: 6.8 K/UL (ref 1.7–7.7)
NEUTROPHILS RELATIVE PERCENT: 80.7 %
NITRITE, URINE: NEGATIVE
PDW BLD-RTO: 18.6 % (ref 12.4–15.4)
PH UA: 5.5 (ref 5–8)
PLATELET # BLD: 201 K/UL (ref 135–450)
PMV BLD AUTO: 8.4 FL (ref 5–10.5)
POTASSIUM SERPL-SCNC: 3.8 MMOL/L (ref 3.5–5.1)
PREALBUMIN: 24 MG/DL (ref 20–40)
PROTEIN UA: 100 MG/DL
RBC # BLD: 3.32 M/UL (ref 4–5.2)
RBC UA: 1 /HPF (ref 0–4)
SEDIMENTATION RATE, ERYTHROCYTE: 56 MM/HR (ref 0–30)
SODIUM BLD-SCNC: 144 MMOL/L (ref 136–145)
SPECIFIC GRAVITY UA: 1.02 (ref 1–1.03)
T4 FREE: 1.6 NG/DL (ref 0.9–1.8)
TOTAL PROTEIN: 6.8 G/DL (ref 6.4–8.2)
TRIGL SERPL-MCNC: 81 MG/DL (ref 0–150)
TSH REFLEX FT4: 7.68 UIU/ML (ref 0.27–4.2)
URINE TYPE: ABNORMAL
UROBILINOGEN, URINE: 0.2 E.U./DL
VITAMIN B-12: 484 PG/ML (ref 211–911)
VITAMIN D 25-HYDROXY: 66.5 NG/ML
VLDLC SERPL CALC-MCNC: 16 MG/DL
WBC # BLD: 8.5 K/UL (ref 4–11)
WBC UA: 1 /HPF (ref 0–5)

## 2022-10-18 PROCEDURE — 3044F HG A1C LEVEL LT 7.0%: CPT | Performed by: INTERNAL MEDICINE

## 2022-10-18 PROCEDURE — G0439 PPPS, SUBSEQ VISIT: HCPCS | Performed by: INTERNAL MEDICINE

## 2022-10-18 PROCEDURE — G0008 ADMIN INFLUENZA VIRUS VAC: HCPCS | Performed by: INTERNAL MEDICINE

## 2022-10-18 PROCEDURE — 90694 VACC AIIV4 NO PRSRV 0.5ML IM: CPT | Performed by: INTERNAL MEDICINE

## 2022-10-18 RX ORDER — ACETAMINOPHEN 160 MG
2000 TABLET,DISINTEGRATING ORAL DAILY
Qty: 90 CAPSULE | Refills: 1 | Status: SHIPPED | OUTPATIENT
Start: 2022-10-18

## 2022-10-18 SDOH — ECONOMIC STABILITY: FOOD INSECURITY: WITHIN THE PAST 12 MONTHS, YOU WORRIED THAT YOUR FOOD WOULD RUN OUT BEFORE YOU GOT MONEY TO BUY MORE.: NEVER TRUE

## 2022-10-18 SDOH — ECONOMIC STABILITY: FOOD INSECURITY: WITHIN THE PAST 12 MONTHS, THE FOOD YOU BOUGHT JUST DIDN'T LAST AND YOU DIDN'T HAVE MONEY TO GET MORE.: NEVER TRUE

## 2022-10-18 ASSESSMENT — PATIENT HEALTH QUESTIONNAIRE - PHQ9
SUM OF ALL RESPONSES TO PHQ9 QUESTIONS 1 & 2: 0
SUM OF ALL RESPONSES TO PHQ QUESTIONS 1-9: 0
SUM OF ALL RESPONSES TO PHQ QUESTIONS 1-9: 0
2. FEELING DOWN, DEPRESSED OR HOPELESS: 0
1. LITTLE INTEREST OR PLEASURE IN DOING THINGS: 0
SUM OF ALL RESPONSES TO PHQ QUESTIONS 1-9: 0
SUM OF ALL RESPONSES TO PHQ QUESTIONS 1-9: 0

## 2022-10-18 ASSESSMENT — LIFESTYLE VARIABLES: HOW OFTEN DO YOU HAVE A DRINK CONTAINING ALCOHOL: NEVER

## 2022-10-18 ASSESSMENT — SOCIAL DETERMINANTS OF HEALTH (SDOH): HOW HARD IS IT FOR YOU TO PAY FOR THE VERY BASICS LIKE FOOD, HOUSING, MEDICAL CARE, AND HEATING?: NOT HARD AT ALL

## 2022-10-18 NOTE — PROGRESS NOTES
Medicare Annual Wellness Visit    Harshad Hester is here for Medicare AWV    Assessment & Plan   Initial Medicare annual wellness visit  Need for influenza vaccination  -     Influenza, FLUAD, (age 72 y+), IM, Preservative Free, 0.5 mL  CKD (chronic kidney disease) stage 4, GFR 15-29 ml/min ([facility]) we will monitor renal function and electrolytes. -     Sedimentation Rate; Future  -     Urinalysis with Microscopic; Future  -     Comprehensive Metabolic Panel; Future  -     CBC with Auto Differential; Future  Mixed hyperlipidemia  , Tolerating atorvastatin 40 mg daily, monitor lipid profile and liver function test.  Lab Results   Component Value Date    CHOL 186 11/04/2020    CHOL 226 (H) 09/09/2020     Lab Results   Component Value Date    TRIG 119 11/04/2020    TRIG 126 09/09/2020     Lab Results   Component Value Date    HDL 79 11/04/2020    HDL 81 09/09/2020     Lab Results   Component Value Date    LDLCALC 83 11/04/2020    LDLCALC 120 09/09/2020     No results found for: LABVLDL, VLDL  No results found for: CHOLHDLRATIO    -     Lipid Panel; Future  Secondary renal hyperparathyroidism ([facility] [address] monitor renal function and calcium level. Type 2 diabetes mellitus with complication, without long-term current use of insulin ([facility] [address] monitor level. In past a1c has not been acurate , so will also get fructosamine.  -     Fructosamine; Future  -     Hemoglobin A1C; Future  -     Vitamin B12; Future  Acquired hypothyroidism, on 25 mcg daily, monitor tsh to see if adequate.  -     TSH with Reflex to FT4; Future  History of myocardial infarction, continue statin. Vitamin D deficiency, continue supplement and monitor level to see if adequate. -     Cholecalciferol (VITAMIN D3) 50 MCG (2000 UT) CAPS; Take 1 capsule by mouth daily, Disp-90 capsule, R-1Normal  -     Vitamin D 25 Hydroxy; Future  Aortic stenosis, moderate:  saw cardiology recently and stable.   Bilateral leg edema, will have patient start to use futuro support socks for men over the counter that are more comfortable and easy to apply. Monitor prealbumin lesion.  -     Prealbumin; Future  -     Urinalysis with Microscopic; Future    Recommendations for Preventive Services Due: see orders and patient instructions/AVS.  Recommended screening schedule for the next 5-10 years is provided to the patient in written form: see Patient Instructions/AVS.     Return in 4 weeks (on 11/15/2022) for Medicare Annual Wellness Visit in 1 year. Subjective   The following acute and/or chronic problems were also addressed today:    Patient's complete Health Risk Assessment and screening values have been reviewed and are found in Flowsheets. The following problems were reviewed today and where indicated follow up appointments were made and/or referrals ordered. Positive Risk Factor Screenings with Interventions:     Cognitive: Words recalled: 0 Words Recalled  Clock Drawing Test (CDT): (!) Abnormal  Total Score Interpretation: Abnormal Mini-Cog  Did the patient refuse to take the cognition test?: No  Cognitive Impairment Interventions:  Patient advised to follow-up in this office for further evaluation and treatment within 1 month(s)           General Health and ACP:  General  In general, how would you say your health is?: Fair  In the past 7 days, have you experienced any of the following: New or Increased Pain, New or Increased Fatigue, Loneliness, Social Isolation, Stress or Anger?: No  Do you get the social and emotional support that you need?: Yes  Do you have a Living Will?: (!) No    Advance Directives       Power of [address] Will ACP-Advance Directive ACP-Power of     Not on File Not on File Not on File Not on File          General Health Risk Interventions:  Poor self-assessment of health status: patient advised to follow-up in this office for further evaluation and treatment of hypertension within 1 month(s)  Pain issues: walk as tolerated.   Fatigue: walk as tolerated  Loneliness: patient's comments regarding inadequate social support: good social support from son who lives with her. Social isolation: patient's comments regarding inadequate social support: good social support from son. Stress: patient's comments regarding reasons for stress and/or anger: no stress or anger issues. Anger: patient's comments regarding reasons for stress and/or anger: no stress or anger issues. Inadequate social/emotional support: patient's comments regarding inadequate social support: good social support. No Living Will: Patient referred to [place] Habits/Nutrition:  Physical Activity: Inactive    Days of Exercise per Week: 0 days    Minutes of Exercise per Session: 0 min     Have you lost any weight without trying in the past 3 months?: No  Body mass index: (!) 26.56  Have you seen the dentist within the past year?: (!) No  Health Habits/Nutrition Interventions:  Inadequate physical activity:  ambulate as tolerated. Nutritional issues:  Not getting a lot of protein in diet, will chech prealbumin level and recommend glucerna high protein drink daily. Only one daily due to stage 4 CKD. Dental exam overdue:  patient encouraged to make appointment with his/her dentist    Hearing/Vision:  Do you or your family notice any trouble with your hearing that hasn't been managed with hearing aids?: (!) Yes  Do you have difficulty driving, watching TV, or doing any of your daily activities because of your eyesight?: No  Have you had an eye exam within the past year?: Yes  No results found. Hearing/Vision Interventions:  Hearing concerns:  has hearing problems but seen by audiologist and refuses to use hearing aids. She is competent enough to make that decision. Will continue to encourage use. Vision concerns:  [Patient saw DR. Lisa Mata earlier this year and stable exam.     ADLs:  In the past 7 days, did you need help from others to perform any of the following everyday activities: Eating, dressing, grooming, bathing, toileting, or walking/balance?: (!) Yes  In the past 7 days, did you need help from others to take care of any of the following: Laundry, housekeeping, banking/finances, shopping, telephone use, food preparation, transportation, or taking medications?: (!) Yes  ADL Interventions:  Patient declines any further evaluation/treatment for this issue          Objective   Vitals:    10/18/22 1341 10/18/22 1346 10/18/22 1357 10/18/22 1358   BP: (!) 194/94 (!) 201/96 (!) 180/90 (!) 180/80   Site:   Left Upper Arm Right Upper Arm   Position:   Sitting Sitting   Cuff Size:   Large Adult Large Adult   Pulse: 77 74 61    Temp: 97.3 °F (36.3 °C)      TempSrc: Temporal      SpO2: 98%  95%    Weight: 136 lb (61.7 kg)      Height: 5' (1.524 m)         Body mass index is 26.56 kg/m². Physical Exam  Constitutional:       General: She is not in acute distress. Appearance: She is well-developed. She is not diaphoretic. HENT:      Head: Normocephalic and atraumatic. Right Ear: External ear normal.      Left Ear: External ear normal.      Nose: Nose normal.      Mouth/Throat:      Pharynx: No oropharyngeal exudate. Eyes:      General: No scleral icterus. Right eye: No discharge. Left eye: No discharge. Conjunctiva/sclera: Conjunctivae normal.      Pupils: Pupils are equal, round, and reactive to light. Neck:      Thyroid: No thyromegaly. Vascular: No JVD. Trachea: No tracheal deviation. Cardiovascular:      Rate and Rhythm: Normal rate and regular rhythm. Pulses:           Carotid pulses are 0 on the right side and 0 on the left side. Dorsalis pedis pulses are 0 on the right side and 0 on the left side. Heart sounds: Normal heart sounds. No murmur heard. No gallop. Pulmonary:      Effort: Pulmonary effort is normal. No respiratory distress. Breath sounds: Normal breath sounds. No wheezing or rales. Chest:      Chest wall: No tenderness. Abdominal:      General: Bowel sounds are normal. There is no distension. Palpations: Abdomen is soft. There is no mass. Tenderness: There is no abdominal tenderness. There is no right CVA tenderness, left CVA tenderness, guarding or rebound. Musculoskeletal:         General: No tenderness. Normal range of motion. Cervical back: Normal range of motion and neck supple. Right lower leg: Edema present. Left lower leg: Edema present. Lymphadenopathy:      Cervical: No cervical adenopathy. Comments: No adenopathy of cervical, supraclavicular, axillary or inguinal nodes. Skin:     General: Skin is warm and dry. Coloration: Skin is not pale. Findings: No erythema or rash. Neurological:      Mental Status: She is alert and oriented to person, place, and time. Cranial Nerves: No cranial nerve deficit. Sensory: No sensory deficit. Motor: No abnormal muscle tone. Coordination: Coordination normal.      Gait: Gait abnormal.      Deep Tendon Reflexes: Reflexes are normal and symmetric. Comments: Slow flexed forward gait with age and arthritis. Psychiatric:         Mood and Affect: Mood normal.         Behavior: Behavior normal.         Thought Content: Thought content normal.         Judgment: Judgment normal.              Allergies   Allergen Reactions    No Known Allergies      Prior to Visit Medications    Medication Sig Taking?  Authorizing Provider   Cholecalciferol (VITAMIN D3) 50 MCG (2000 UT) CAPS Take 1 capsule by mouth daily Yes Renate Navarrete MD   isosorbide mononitrate (IMDUR) 60 MG extended release tablet TAKE 1 TABLET BY MOUTH DAILY Yes Linette See MD   atorvastatin (LIPITOR) 40 MG tablet TAKE 1 TABLET BY MOUTH DAILY Yes Linette See MD   amiodarone (CORDARONE) 200 MG tablet TAKE 1 TABLET BY MOUTH DAILY Yes Swapna De La Rosa MD   levothyroxine (SYNTHROID) 25 MCG tablet TAKE 1/2 TABLET BY MOUTH DAILY(MAKE AN APPOINTMENT BEFORE NEXT REFILL) Yes Preeti Manzano MD   doxycycline hyclate (VIBRA-TABS) 100 MG tablet TAKE 1 TABLET BY MOUTH TWICE DAILY Yes Carisa Mejia MD   Lactobacillus (PROBIOTIC ACIDOPHILUS) TABS Take 1 tablet by mouth daily Yes Magan Kuhn MD       CareTeam (Including outside providers/suppliers regularly involved in providing care):   Patient Care Team:  Preeti Manzano MD as PCP - General (Internal Medicine)  Preeti Manzano MD as PCP - [facility] Empaneled Provider     Reviewed and updated this visit:  Tobacco  Allergies  Meds  Problems  Med Hx  Surg Hx  Soc Hx  Fam Hx                 Medicare Annual Wellness Visit    Krupa Lomas is here for Medicare AWV    Assessment & Plan   Initial Medicare annual wellness visit  Need for influenza vaccination  -     Influenza, FLUAD, (age 72 y+), IM, Preservative Free, 0.5 mL  CKD (chronic kidney disease) stage 4, GFR 15-29 ml/min ([facility])  -     Sedimentation Rate; Future  -     Urinalysis with Microscopic; Future  -     Comprehensive Metabolic Panel; Future  -     CBC with Auto Differential; Future  Mixed hyperlipidemia  -     Lipid Panel; Future  Secondary renal hyperparathyroidism (HCC)  Type 2 diabetes mellitus with complication, without long-term current use of insulin (HCC)  -     Fructosamine; Future  -     Hemoglobin A1C; Future  -     Vitamin B12; Future  Acquired hypothyroidism  -     TSH with Reflex to FT4; Future  History of myocardial infarction  Vitamin D deficiency  -     Cholecalciferol (VITAMIN D3) 50 MCG (2000 UT) CAPS; Take 1 capsule by mouth daily, Disp-90 capsule, R-1Normal  -     Vitamin D 25 Hydroxy; Future  Aortic stenosis, moderate  Bilateral leg edema  -     Prealbumin; Future  -     Urinalysis with Microscopic;  Future    Recommendations for Preventive Services Due: see orders and patient instructions/AVS.  Recommended screening schedule for the next 5-10 years is provided to the patient in written form: see Patient Instructions/AVS.     Return in 4 weeks (on 11/15/2022) for Medicare Annual Wellness Visit in 1 year. Subjective   The following acute and/or chronic problems were also addressed today:  Will discuss elevated blood pressure with cardiology to see if can restart amlodipine 2.5 mg qd. Previously on 5 mg and discontinued due to aortic stenosis to avoid drops in bp with activity. With naturally slow pulse, betablockers are not an option. Due to stage 4 CKD, ACE inhibitors and ARB's are not an option. Will further instruct son after discussing with cardiology. Patient's complete Health Risk Assessment and screening values have been reviewed and are found in Flowsheets. The following problems were reviewed today and where indicated follow up appointments were made and/or referrals ordered. Positive Risk Factor Screenings with Interventions:     Cognitive: Words recalled: 0 Words Recalled  Clock Drawing Test (CDT): (!) Abnormal  Total Score Interpretation: Abnormal Mini-Cog  Did the patient refuse to take the cognition test?: No  Cognitive Impairment Interventions:  Patient advised to follow-up in this office for further evaluation and treatment within 1 month(s)           General Health and ACP:  General  In general, how would you say your health is?: Fair  In the past 7 days, have you experienced any of the following: New or Increased Pain, New or Increased Fatigue, Loneliness, Social Isolation, Stress or Anger?: No  Do you get the social and emotional support that you need?: Yes  Do you have a Living Will?: (!) No    Advance Directives       Power of [address] Will ACP-Advance Directive ACP-Power of     Not on File Not on File Not on File Not on File          General Health Risk Interventions:  Poor self-assessment of health status: feels that health is good. Pain issues: no pain issues  Fatigue: some fatigue will check labs.   Loneliness: patient's comments regarding inadequate social support: good social support  Social isolation: patient's comments regarding inadequate social support: good social support  Stress: patient's comments regarding reasons for stress and/or anger: no anger issues  Anger: patient's comments regarding reasons for stress and/or anger: no anger or stress. Inadequate social/emotional support: patient's comments regarding inadequate social support: good social support from her son. No Living Will: Patient referred to [place] Habits/Nutrition:  Physical Activity: Inactive    Days of Exercise per Week: 0 days    Minutes of Exercise per Session: 0 min     Have you lost any weight without trying in the past 3 months?: No  Body mass index: (!) 26.56  Have you seen the dentist within the past year?: (!) No  Health Habits/Nutrition Interventions:  Inadequate physical activity:  make appointment to the dentist.  Nutritional issues:  start glucerna, high protein drink qd. Dental exam overdue:  patient encouraged to make appointment with his/her dentist    Hearing/Vision:  Do you or your family notice any trouble with your hearing that hasn't been managed with hearing aids?: (!) Yes  Do you have difficulty driving, watching TV, or doing any of your daily activities because of your eyesight?: No  Have you had an eye exam within the past year?: Yes  No results found. Hearing/Vision Interventions:  Hearing concerns:  patient declines any further evaluation/treatment for hearing issues  Vision concerns:  eye exam is up to date.      ADLs:  In the past 7 days, did you need help from others to perform any of the following everyday activities: Eating, dressing, grooming, bathing, toileting, or walking/balance?: (!) Yes  In the past 7 days, did you need help from others to take care of any of the following: Laundry, housekeeping, banking/finances, shopping, telephone use, food preparation, transportation, or taking medications?: (!) Yes  ADL Interventions:  Patient declines any further evaluation/treatment for this issue          Objective   Vitals:    10/18/22 1341 10/18/22 1346 10/18/22 1357 10/18/22 1358   BP: (!) 194/94 (!) 201/96 (!) 180/90 (!) 180/80   Site:   Left Upper Arm Right Upper Arm   Position:   Sitting Sitting   Cuff Size:   Large Adult Large Adult   Pulse: 77 74 61    Temp: 97.3 °F (36.3 °C)      TempSrc: Temporal      SpO2: 98%  95%    Weight: 136 lb (61.7 kg)      Height: 5' (1.524 m)         Body mass index is 26.56 kg/m². Allergies   Allergen Reactions    No Known Allergies      Prior to Visit Medications    Medication Sig Taking?  Authorizing Provider   Cholecalciferol (VITAMIN D3) 50 MCG (2000 UT) CAPS Take 1 capsule by mouth daily Yes Nell Moore MD   isosorbide mononitrate (IMDUR) 60 MG extended release tablet TAKE 1 TABLET BY MOUTH DAILY Yes Jaquan Lacey MD   atorvastatin (LIPITOR) 40 MG tablet TAKE 1 TABLET BY MOUTH DAILY Yes Jaquan Lacey MD   amiodarone (CORDARONE) 200 MG tablet TAKE 1 TABLET BY MOUTH DAILY Yes Deonna Mendoza MD   levothyroxine (SYNTHROID) 25 MCG tablet TAKE 1/2 TABLET BY MOUTH DAILY(MAKE AN APPOINTMENT BEFORE NEXT REFILL) Yes Jaquan Lacey MD   doxycycline hyclate (VIBRA-TABS) 100 MG tablet TAKE 1 TABLET BY MOUTH TWICE DAILY Yes Thania Martinez MD   Lactobacillus (PROBIOTIC ACIDOPHILUS) TABS Take 1 tablet by mouth daily Yes Nell Moore MD       [facility] (Including outside providers/suppliers regularly involved in providing care):   Patient Care Team:  Jaquan Lacey MD as PCP - General (Internal Medicine)  Jaquan Lacey MD as PCP - [facility] Empaneled Provider     Reviewed and updated this visit:  Tobacco  Allergies  Meds  Problems  Med Hx  Surg Hx  Soc Hx  Fam Hx

## 2022-10-18 NOTE — PATIENT INSTRUCTIONS
[employer] Futuro men's support socks, knee high that are more comfortable. She can try a small or medium. Personalized Preventive Plan for Hanna Galindo - 10/18/2022  Medicare offers a range of preventive health benefits. Some of the tests and screenings are paid in full while other may be subject to a deductible, co-insurance, and/or copay. Some of these benefits include a comprehensive review of your medical history including lifestyle, illnesses that may run in your family, and various assessments and screenings as appropriate. After reviewing your medical record and screening and assessments performed today your provider may have ordered immunizations, labs, imaging, and/or referrals for you. A list of these orders (if applicable) as well as your Preventive Care list are included within your After Visit Summary for your review. Other Preventive Recommendations:    A preventive eye exam performed by an eye specialist is recommended every 1-2 years to screen for glaucoma; cataracts, macular degeneration, and other eye disorders. A preventive dental visit is recommended every 6 months. Try to get at least 150 minutes of exercise per week or 10,000 steps per day on a pedometer . Order or download the FREE \"Exercise & Physical Activity: Your Everyday Guide\" from [employer]. Call 0-693.425.8388 or search [employer] online. You need 5427-3458 mg of calcium and 8403-9703 IU of vitamin D per day. It is possible to meet your calcium requirement with diet alone, but a vitamin D supplement is usually necessary to meet this goal.  When exposed to the sun, use a sunscreen that protects against both UVA and UVB radiation with an SPF of 30 or greater. Reapply every 2 to 3 hours or after sweating, drying off with a towel, or swimming. Always wear a seat belt when traveling in a car. Always wear a helmet when riding a bicycle or motorcycle.   Personalized Preventive Plan for Ana Robertson - 10/18/2022  Medicare offers a range of preventive health benefits. Some of the tests and screenings are paid in full while other may be subject to a deductible, co-insurance, and/or copay. Some of these benefits include a comprehensive review of your medical history including lifestyle, illnesses that may run in your family, and various assessments and screenings as appropriate. After reviewing your medical record and screening and assessments performed today your provider may have ordered immunizations, labs, imaging, and/or referrals for you. A list of these orders (if applicable) as well as your Preventive Care list are included within your After Visit Summary for your review. Other Preventive Recommendations:    A preventive eye exam performed by an eye specialist is recommended every 1-2 years to screen for glaucoma; cataracts, macular degeneration, and other eye disorders. A preventive dental visit is recommended every 6 months. Try to get at least 150 minutes of exercise per week or 10,000 steps per day on a pedometer . Order or download the FREE \"Exercise & Physical Activity: Your Everyday Guide\" from [employer]. Call 4-272.873.1248 or search [employer] online. You need 3739-4034 mg of calcium and 5944-4571 IU of vitamin D per day. It is possible to meet your calcium requirement with diet alone, but a vitamin D supplement is usually necessary to meet this goal.  When exposed to the sun, use a sunscreen that protects against both UVA and UVB radiation with an SPF of 30 or greater. Reapply every 2 to 3 hours or after sweating, drying off with a towel, or swimming. Always wear a seat belt when traveling in a car. Always wear a helmet when riding a bicycle or motorcycle. Drink either pure protein or boost high protein or glucerna daily.

## 2022-10-19 DIAGNOSIS — E03.9 ACQUIRED HYPOTHYROIDISM: ICD-10-CM

## 2022-10-19 DIAGNOSIS — M13.0 POLYARTHRITIS: Primary | ICD-10-CM

## 2022-10-19 LAB
ESTIMATED AVERAGE GLUCOSE: 105.4 MG/DL
HBA1C MFR BLD: 5.3 %

## 2022-10-19 RX ORDER — LEVOTHYROXINE SODIUM 0.03 MG/1
25 TABLET ORAL DAILY
Qty: 90 TABLET | Refills: 1 | Status: SHIPPED | OUTPATIENT
Start: 2022-10-19

## 2022-10-19 NOTE — RESULT ENCOUNTER NOTE
Patient's son called and informed of low thyroid and Synthroid increased from 12.5-25 MCG daily. No urinary infection. Diabetes is controlled with diet. Stable stage IV chronic kidney disease. Cholesterol is controlled. Adequate protein. Stable anemia of chronic kidney disease. Elevated sed rate we will monitor. No complaint of scalp pain but does have joint aches. Reluctant to use prednisone with history of chronic antibiotic therapy for chronic foreign body of abdominal wall for which she takes doxycycline twice a day. Patient only getting 1 tablet in a day. No fevers or chills or abdominal pain. Will monitor.

## 2022-10-20 DIAGNOSIS — M13.0 POLYARTHRITIS: ICD-10-CM

## 2022-10-20 LAB
FRUCTOSAMINE: 314 UMOL/L (ref 205–285)
URIC ACID, SERUM: 3.4 MG/DL (ref 2.6–6)

## 2022-10-21 RX ORDER — AMIODARONE HYDROCHLORIDE 200 MG/1
200 TABLET ORAL DAILY
Qty: 90 TABLET | Refills: 1 | Status: SHIPPED | OUTPATIENT
Start: 2022-10-21

## 2022-10-21 NOTE — TELEPHONE ENCOUNTER
Refill on amiodarone (CORDARONE) 200 MG please send to [facility] pharmacy on [address]

## 2022-11-21 ENCOUNTER — OFFICE VISIT (OUTPATIENT)
Dept: PRIMARY CARE CLINIC | Age: 87
End: 2022-11-21
Payer: MEDICARE

## 2022-11-21 VITALS
OXYGEN SATURATION: 98 % | HEART RATE: 74 BPM | WEIGHT: 130 LBS | SYSTOLIC BLOOD PRESSURE: 142 MMHG | DIASTOLIC BLOOD PRESSURE: 60 MMHG | BODY MASS INDEX: 25.39 KG/M2 | TEMPERATURE: 98.7 F

## 2022-11-21 DIAGNOSIS — R70.0 ELEVATED SED RATE: ICD-10-CM

## 2022-11-21 DIAGNOSIS — R74.8 ALKALINE PHOSPHATASE ELEVATION: ICD-10-CM

## 2022-11-21 DIAGNOSIS — N18.4 CKD (CHRONIC KIDNEY DISEASE) STAGE 4, GFR 15-29 ML/MIN (HCC): ICD-10-CM

## 2022-11-21 DIAGNOSIS — E03.9 ACQUIRED HYPOTHYROIDISM: ICD-10-CM

## 2022-11-21 DIAGNOSIS — E78.2 MIXED HYPERLIPIDEMIA: ICD-10-CM

## 2022-11-21 DIAGNOSIS — N18.4 CKD (CHRONIC KIDNEY DISEASE) STAGE 4, GFR 15-29 ML/MIN (HCC): Primary | ICD-10-CM

## 2022-11-21 LAB — SEDIMENTATION RATE, ERYTHROCYTE: 8 MM/HR (ref 0–30)

## 2022-11-21 PROCEDURE — 99214 OFFICE O/P EST MOD 30 MIN: CPT | Performed by: INTERNAL MEDICINE

## 2022-11-21 RX ORDER — EZETIMIBE 10 MG/1
10 TABLET ORAL DAILY
Qty: 90 TABLET | Refills: 1 | Status: SHIPPED | OUTPATIENT
Start: 2022-11-21 | End: 2022-11-22

## 2022-11-21 ASSESSMENT — PATIENT HEALTH QUESTIONNAIRE - PHQ9
2. FEELING DOWN, DEPRESSED OR HOPELESS: 0
SUM OF ALL RESPONSES TO PHQ QUESTIONS 1-9: 0
1. LITTLE INTEREST OR PLEASURE IN DOING THINGS: 0
SUM OF ALL RESPONSES TO PHQ9 QUESTIONS 1 & 2: 0

## 2022-11-21 ASSESSMENT — ENCOUNTER SYMPTOMS
VOICE CHANGE: 0
BACK PAIN: 0
TROUBLE SWALLOWING: 0
EYE DISCHARGE: 0
ABDOMINAL DISTENTION: 0
VOMITING: 0
SORE THROAT: 0
DIARRHEA: 0
STRIDOR: 0
CHEST TIGHTNESS: 0
ABDOMINAL PAIN: 0
BLOOD IN STOOL: 0
WHEEZING: 0
EYE PAIN: 0
NAUSEA: 0
PHOTOPHOBIA: 0
APNEA: 0
CHOKING: 0
ANAL BLEEDING: 0
SHORTNESS OF BREATH: 0
COUGH: 0
RECTAL PAIN: 0
EYE REDNESS: 0
RHINORRHEA: 0
CONSTIPATION: 0
EYE ITCHING: 0
COLOR CHANGE: 0

## 2022-11-21 NOTE — PATIENT INSTRUCTIONS
8$ a month , super juice, order on [employer]. Take 45 fruits and vegetables , flash freeze and dehydrate into pills.

## 2022-11-21 NOTE — PROGRESS NOTES
Kvng Almanza (:  1921) is a 80 y.o. female,Established patient, here for evaluation of the following chief complaint(s):  Hypertension         ASSESSMENT/PLAN:  1. CKD (chronic kidney disease) stage 4, GFR 15-29 ml/min (HCC) stable and blood pressure is better controlled with increasing hydralazine. We will continue to monitor. No more leg edema no complaint of nausea pruritus. Labs Renal Latest Ref Rng & Units 10/18/2022 2022 2020 2020 2020   BUN 7 - 20 mg/dL 18 20 35(H) 42(H) 37(H)   Cr 0.6 - 1.2 mg/dL 1.9(H) 2. 1(H) 1.95(H) 1.78(H) 1.82(H)   K 3.5 - 5.1 mmol/L 3.8 3.4(L) 3.8 4.3 4.1   Na 136 - 145 mmol/L 144 142 144 143 141       -     Renal Function Panel; Future  2. Acquired hypothyroidism feeling better less leg edema after last visit levothyroxine increased from 12.5-25 MCG daily. Monitor TSH.  -     TSH with Reflex to FT4; Future  3. Mixed hyperlipidemia: Stable on current therapy. Lab Results   Component Value Date    CHOL 184 10/18/2022    CHOL 186 2020    CHOL 226 (H) 2020     Lab Results   Component Value Date    TRIG 81 10/18/2022    TRIG 119 2020    TRIG 126 2020     Lab Results   Component Value Date     (H) 10/18/2022    HDL 79 2020    HDL 81 2020     Lab Results   Component Value Date    LDLCALC 65 10/18/2022    LDLCALC 83 2020    LDLCALC 120 2020     Lab Results   Component Value Date    LABVLDL 16 10/18/2022     No results found for: CHOLHDLRATIO   -     ezetimibe (ZETIA) 10 MG tablet; Take 1 tablet by mouth daily, Disp-90 tablet, R-1Normal  4. Alkaline phosphatase elevation, mild elevation we will fractionate to see if from liver or bone. -     ALKALINE PHOSPHATASE, ISOENZYMES; Future  5. Elevated sed rate on  tingling patient's blood pressure is elevated and she had a headache but no scalp tenderness sed rate was 56.   Although symptoms have resolved and no complaint of generalized muscle pain or aching. Sed rate elevation may be due to chronic renal disease. No acute tenosynovitis or myalgias or arthralgias. We will get a follow-up sed rate. No clinical signs of polymyalgia rheumatica  -     Sedimentation Rate; Future    6. Patient will need a light weight wheel chair that she can self propel to complete ADL's at home for at least 6 hours per day use. Son, who lives with her, is available to assist her when needed. With the osteoarthritis of the left knee, it is painful to ambulate and he moving about in the wheel chair prevents the pain that weight bearing activities with a walker or cane cause. Return in about 2 months (around 1/21/2023). Subjective   SUBJECTIVE/OBJECTIVE:  Hypothyroidism: Patient presents for evaluation of thyroid function. Symptoms consist of denies fatigue, weight changes, heat/cold intolerance, bowel/skin changes or CVS symptoms. Symptoms have present for 0 months. The symptoms are no. The problem has been gradually improving with resolution of leg edema and fatigue. Previous thyroid studies include TSH and total T4. The hypothyroidism is due to hypothyroidism and Hypothyroidism. Hyperlipidemia  This is a chronic problem. The current episode started more than 1 year ago. The problem is controlled. Recent lipid tests were reviewed and are low. Exacerbating diseases include chronic renal disease and diabetes. She has no history of obesity or nephrotic syndrome. There are no known factors aggravating her hyperlipidemia. Pertinent negatives include no chest pain, focal sensory loss, focal weakness, leg pain, myalgias or shortness of breath. Current antihyperlipidemic treatment includes statins. The current treatment provides significant improvement of lipids. There are no compliance problems (Son gives her her medications regularly. ).   Risk factors for coronary artery disease include dyslipidemia, diabetes mellitus, hypertension and post-menopausal.     Review of Systems   Constitutional: Negative. Negative for activity change, appetite change, chills, diaphoresis, fatigue and fever. HENT:  Negative for dental problem, ear pain, hearing loss, mouth sores, nosebleeds, postnasal drip, rhinorrhea, sore throat, trouble swallowing and voice change. Eyes:  Negative for photophobia, pain, discharge, redness, itching and visual disturbance. Respiratory:  Negative for apnea, cough, choking, chest tightness, shortness of breath, wheezing and stridor. Cardiovascular:  Negative for chest pain, palpitations and leg swelling. Hypertension hyperlipidemia. Remote history of coronary artery bypass surgery. Gastrointestinal:  Negative for abdominal distention, abdominal pain, anal bleeding, blood in stool, constipation, diarrhea, nausea, rectal pain and vomiting. History of foreign body abdominal wall high risk surgical patient so will need to take doxycycline for life for infectious disease and this keep it under control. Previously very ill and hospitalized several years ago from infection. Endocrine:        Type II diabetes not insulin requiring  hyperlipidemia   Genitourinary:  Negative for dysuria, flank pain, frequency and hematuria. Stage 4 chronic kidney disease   Musculoskeletal:  Negative for arthralgias, back pain, gait problem, joint swelling, myalgias, neck pain and neck stiffness. Generalized osteoarthritis       Skin:  Negative for color change, pallor, rash and wound. Neurological:  Negative for dizziness, tremors, focal weakness, seizures, syncope, facial asymmetry, speech difficulty, weakness, light-headedness, numbness and headaches. Hematological:  Negative for adenopathy. Does not bruise/bleed easily. Psychiatric/Behavioral:  Negative for agitation, behavioral problems, confusion, decreased concentration, dysphoric mood, hallucinations, self-injury, sleep disturbance and suicidal ideas.  The patient is not nervous/anxious and is not hyperactive. Objective   Physical Exam  Constitutional:       General: She is not in acute distress. Appearance: She is well-developed. She is not diaphoretic. HENT:      Head: Normocephalic and atraumatic. Right Ear: External ear normal.      Left Ear: External ear normal.      Nose: Nose normal.      Mouth/Throat:      Pharynx: No oropharyngeal exudate. Eyes:      General: No scleral icterus. Right eye: No discharge. Left eye: No discharge. Conjunctiva/sclera: Conjunctivae normal.      Pupils: Pupils are equal, round, and reactive to light. Neck:      Thyroid: No thyromegaly. Vascular: No JVD. Trachea: No tracheal deviation. Cardiovascular:      Rate and Rhythm: Normal rate and regular rhythm. Pulses:           Carotid pulses are 0 on the right side and 0 on the left side. Dorsalis pedis pulses are 0 on the right side and 0 on the left side. Heart sounds: Normal heart sounds. No murmur heard. No gallop. Pulmonary:      Effort: Pulmonary effort is normal. No respiratory distress. Breath sounds: Normal breath sounds. No wheezing or rales. Chest:      Chest wall: No tenderness. Abdominal:      General: Bowel sounds are normal. There is no distension. Palpations: Abdomen is soft. There is no mass. Tenderness: There is no abdominal tenderness. There is no right CVA tenderness, left CVA tenderness, guarding or rebound. Musculoskeletal:         General: No tenderness. Normal range of motion. Cervical back: Normal range of motion and neck supple. Right lower leg: No edema. Left lower leg: No edema. Comments: Edema improved and now trace   Lymphadenopathy:      Cervical: No cervical adenopathy. Comments: No adenopathy of cervical, supraclavicular, axillary or inguinal nodes. Skin:     General: Skin is warm and dry. Coloration: Skin is not pale. Findings: No erythema or rash. Neurological:      Mental Status: She is alert and oriented to person, place, and time. Cranial Nerves: No cranial nerve deficit. Sensory: No sensory deficit. Motor: No abnormal muscle tone. Coordination: Coordination normal.      Gait: Gait abnormal.      Deep Tendon Reflexes: Reflexes are normal and symmetric. Comments: Slow flexed forward gait with age and arthritis. Psychiatric:         Mood and Affect: Mood normal.         Behavior: Behavior normal.         Thought Content: Thought content normal.         Judgment: Judgment normal.                An electronic signature was used to authenticate this note.     --Nata Gutierrez MD

## 2022-11-22 LAB
ALBUMIN SERPL-MCNC: 3.9 G/DL (ref 3.4–5)
ANION GAP SERPL CALCULATED.3IONS-SCNC: 14 MMOL/L (ref 3–16)
BUN BLDV-MCNC: 22 MG/DL (ref 7–20)
CALCIUM SERPL-MCNC: 9.3 MG/DL (ref 8.3–10.6)
CHLORIDE BLD-SCNC: 105 MMOL/L (ref 99–110)
CO2: 21 MMOL/L (ref 21–32)
CREAT SERPL-MCNC: 2.2 MG/DL (ref 0.6–1.2)
GFR SERPL CREATININE-BSD FRML MDRD: 19 ML/MIN/{1.73_M2}
GLUCOSE BLD-MCNC: 61 MG/DL (ref 70–99)
PHOSPHORUS: 3.3 MG/DL (ref 2.5–4.9)
POTASSIUM SERPL-SCNC: 3.7 MMOL/L (ref 3.5–5.1)
SODIUM BLD-SCNC: 140 MMOL/L (ref 136–145)
TSH REFLEX FT4: 2.46 UIU/ML (ref 0.27–4.2)

## 2022-11-22 NOTE — RESULT ENCOUNTER NOTE
Reviewed lab work with patient son. She is not taking anything to lower the blood sugar. Encouraged her to having the Glucerna drinks more frequently so she does not go long periods without eating and encourage more water.   Overall labs are good with repeat sed rate normal.

## 2022-12-20 DIAGNOSIS — E78.2 MIXED HYPERLIPIDEMIA: ICD-10-CM

## 2022-12-20 DIAGNOSIS — I25.10 CORONARY ARTERY DISEASE INVOLVING NATIVE CORONARY ARTERY OF NATIVE HEART WITHOUT ANGINA PECTORIS: ICD-10-CM

## 2022-12-21 RX ORDER — ATORVASTATIN CALCIUM 40 MG/1
40 TABLET, FILM COATED ORAL DAILY
Qty: 90 TABLET | Refills: 1 | Status: SHIPPED | OUTPATIENT
Start: 2022-12-21

## 2022-12-21 RX ORDER — ISOSORBIDE MONONITRATE 60 MG/1
60 TABLET, EXTENDED RELEASE ORAL DAILY
Qty: 90 TABLET | Refills: 1 | Status: SHIPPED | OUTPATIENT
Start: 2022-12-21

## 2022-12-27 PROBLEM — R06.09 DOE (DYSPNEA ON EXERTION): Status: ACTIVE | Noted: 2022-12-27

## 2022-12-30 ENCOUNTER — TELEPHONE (OUTPATIENT)
Dept: PRIMARY CARE CLINIC | Age: 87
End: 2022-12-30

## 2022-12-30 NOTE — TELEPHONE ENCOUNTER
Amira BISWAS/ stated that patient will be discharged today at 1 pm and sent to [facility]

## 2022-12-30 NOTE — TELEPHONE ENCOUNTER
Spoke with Hospice Nurse and comfort care orders in place in case needed. Spoke with Son who is taking good care of patient and will hold doxycyline that could be causing nausea and vomiting.

## 2023-01-18 ENCOUNTER — TELEMEDICINE (OUTPATIENT)
Dept: PRIMARY CARE CLINIC | Age: 88
End: 2023-01-18

## 2023-01-18 DIAGNOSIS — Z51.5 HOSPICE CARE PATIENT: Primary | ICD-10-CM

## 2023-01-18 DIAGNOSIS — N18.4 CKD (CHRONIC KIDNEY DISEASE) STAGE 4, GFR 15-29 ML/MIN (HCC): ICD-10-CM

## 2023-01-18 DIAGNOSIS — E78.2 MIXED HYPERLIPIDEMIA: ICD-10-CM

## 2023-01-18 DIAGNOSIS — E03.9 ACQUIRED HYPOTHYROIDISM: ICD-10-CM

## 2023-01-18 DIAGNOSIS — I25.10 CORONARY ARTERY DISEASE INVOLVING NATIVE CORONARY ARTERY OF NATIVE HEART WITHOUT ANGINA PECTORIS: ICD-10-CM

## 2023-01-18 RX ORDER — ISOSORBIDE MONONITRATE 60 MG/1
30 TABLET, EXTENDED RELEASE ORAL DAILY
Qty: 90 TABLET | Refills: 1
Start: 2023-01-18

## 2023-01-18 ASSESSMENT — ENCOUNTER SYMPTOMS
DIARRHEA: 0
STRIDOR: 0
PHOTOPHOBIA: 0
EYE ITCHING: 0
APNEA: 0
ABDOMINAL PAIN: 0
NAUSEA: 0
CHOKING: 0
SORE THROAT: 0
COUGH: 0
VOMITING: 0
ABDOMINAL DISTENTION: 0
TROUBLE SWALLOWING: 0
SHORTNESS OF BREATH: 0
WHEEZING: 0
BACK PAIN: 0
EYE REDNESS: 0
CHEST TIGHTNESS: 0
VOICE CHANGE: 0
ANAL BLEEDING: 0
RECTAL PAIN: 0
COLOR CHANGE: 0
EYE PAIN: 0
RHINORRHEA: 0
EYE DISCHARGE: 0
CONSTIPATION: 0
BLOOD IN STOOL: 0

## 2023-01-18 ASSESSMENT — PATIENT HEALTH QUESTIONNAIRE - PHQ9
SUM OF ALL RESPONSES TO PHQ9 QUESTIONS 1 & 2: 0
1. LITTLE INTEREST OR PLEASURE IN DOING THINGS: 0
SUM OF ALL RESPONSES TO PHQ QUESTIONS 1-9: 0
SUM OF ALL RESPONSES TO PHQ QUESTIONS 1-9: 0
2. FEELING DOWN, DEPRESSED OR HOPELESS: 0
SUM OF ALL RESPONSES TO PHQ QUESTIONS 1-9: 0
SUM OF ALL RESPONSES TO PHQ QUESTIONS 1-9: 0

## 2023-01-18 NOTE — PROGRESS NOTES
Louann Pablo (:  1921) is a Established patient, here for evaluation of the following:    Assessment & Plan   Below is the assessment and plan developed based on review of pertinent history, physical exam, labs, studies, and medications. 1. Hospice care patient since December. Patient is 8 years old and was doing fairly well about a month ago became very tired and stopped eating and was taken to the emergency room, conditions were stable and she was placed in the hospice program.  Her son along with hospice continue to provide 24-hour care. She is declining. Today in bed without talking and less cooperative with staff. Some days does not talk in other days is social and talkative. Patient's son understands that she is transitioning. He was reassured that if she refuses her medications that is fine and he can try again the following day and understands at some point she will refuse water and food which she does intermittently now. 2. Coronary artery disease involving native coronary artery of native heart without angina pectoris, isosorbide reduce to half tablet daily since blood pressures are in the 193 systolic range. No complaint of chest pain or shortness of breath and no leg edema. Continues on amiodarone.  -     isosorbide mononitrate (IMDUR) 60 MG extended release tablet; Take 0.5 tablets by mouth daily, Disp-90 tablet, R-1NO PRINT  3. Mixed hyperlipidemia, continues on the atorvastatin  4. CKD (chronic kidney disease) stage 4, GFR 15-29 ml/min ([facility] [address] comfort care only. 5. Acquired hypothyroidism continues on the Synthroid. Return in about 4 weeks (around 2/15/2023). Subjective   In Hospice for failure to thrive. Patient has 24-hour care at home with son and hospice. She has days where she is talkative than other days where she sleeps all the time. She refuses medications frequently. She has days without eating or drinking. She resists efforts to get her out of bed.   Wound on the left heel is being treated and monitored for hospice. No signs of infection reported recently. Son has noticed overall she is declining. She is advanced age and wanted to have stage IV chronic kidney disease coronary artery disease bilateral hearing loss, hyperlipidemia, arthritis of the knees, and type 2 diabetes which has been an issue with poor intake and weight loss, acquired hypothyroidism    Review of Systems   Constitutional: Negative. Negative for activity change, appetite change, chills, diaphoresis, fatigue and fever. HENT:  Negative for dental problem, ear pain, hearing loss, mouth sores, nosebleeds, postnasal drip, rhinorrhea, sore throat, trouble swallowing and voice change. Eyes:  Negative for photophobia, pain, discharge, redness, itching and visual disturbance. Patient could not answer but son has not noticed any symptoms   Respiratory:  Negative for apnea, cough, choking, chest tightness, shortness of breath, wheezing and stridor. Patient could not answer but son has not noticed any respiratory symptoms   Cardiovascular:  Negative for chest pain, palpitations and leg swelling. Hypertension hyperlipidemia. Remote history of coronary artery bypass surgery. Gastrointestinal:  Negative for abdominal distention, abdominal pain, anal bleeding, blood in stool, constipation, diarrhea, nausea, rectal pain and vomiting. History of foreign body abdominal wall high risk surgical patient so will need to take doxycycline for life for infectious disease and this keep it under control. Previously very ill and hospitalized several years ago from infection. Endocrine:        Type II diabetes not insulin requiring  hyperlipidemia   Genitourinary:  Negative for dysuria, flank pain, frequency and hematuria. Stage 4 chronic kidney disease   Musculoskeletal:  Negative for arthralgias, back pain, gait problem, joint swelling, myalgias, neck pain and neck stiffness. Generalized osteoarthritis       Skin:  Positive for wound. Negative for color change, pallor and rash. Wound on the right heel that family changes daily and follows the wound care instructions per hospice and hospice nurse checks twice a week. Neurological:  Negative for dizziness, tremors, seizures, syncope, facial asymmetry, speech difficulty, weakness, light-headedness, numbness and headaches. Patient could not answer and son noticed that she has been sleeping more and less talkative   Hematological:  Negative for adenopathy. Does not bruise/bleed easily. Psychiatric/Behavioral:  Negative for agitation, behavioral problems, confusion, decreased concentration, dysphoric mood, hallucinations, self-injury, sleep disturbance and suicidal ideas. The patient is not nervous/anxious and is not hyperactive. Son has noticed cognitive decline        Objective   Patient-Reported Vitals  No data recorded     Physical Exam  [INSTRUCTIONS:  \"[x]\" Indicates a positive item  \"[]\" Indicates a negative item  -- DELETE ALL ITEMS NOT EXAMINED]    Constitutional: [] Appears well-developed and well-nourished [x] No apparent distress      [x] Abnormal -     Mental status: [] Alert and awake  [] Oriented to person/place/time [] Able to follow commands    [x] Abnormal -sleeping in bed. Did not respond when son tried to arouse her.     Eyes:   EOM    []  Normal    [] Abnormal -   Sclera  []  Normal    [] Abnormal -          Discharge []  None visible   [] Abnormal -     HENT: [x] Normocephalic, atraumatic  [] Abnormal -   [] Mouth/Throat: Mucous membranes are moist    External Ears [x] Normal  [] Abnormal -    Neck: [x] No visualized mass [] Abnormal -     Pulmonary/Chest: [x] Respiratory effort normal   [x] No visualized signs of difficulty breathing or respiratory distress        [] Abnormal -      Musculoskeletal:   [] Normal gait with no signs of ataxia         [] Normal range of motion of neck        [x] Abnormal -but does have spontaneous movement  Lying in the  Neurological:        [x] No Facial Asymmetry (Cranial nerve 7 motor function) (limited exam due to video visit)          [] No gaze palsy        [] Abnormal -          Skin:        [] No significant exanthematous lesions or discoloration noted on facial skin         [x] Abnormal -left heel ulcer is bandaged without drainage           Psychiatric:       [] Normal Affect [x] Abnormal -sleeping did not respond to verbal commands. [] No Hallucinations    Other pertinent observable physical exam findings:-     Right heal shallow wound. Visiting nurse   Hold isosorbide for low blood pressure. Peewee Johnson, was evaluated through a synchronous (real-time) audio-video encounter. The patient (or guardian if applicable) is aware that this is a billable service, which includes applicable co-pays. This Virtual Visit was conducted with patient's (and/or legal guardian's) consent. The visit was conducted pursuant to the emergency declaration under the [facility], [facility] authority and the [employer] General Act. Patient identification was verified, and a caregiver was present when appropriate. The patient was located at Home: [facility]  [address] [address].    Provider was located at [facility] ([address] [place], [place] [address]t): [address],  [address] [facility].        --Maurice Campbell MD